# Patient Record
Sex: FEMALE | Race: OTHER | NOT HISPANIC OR LATINO | URBAN - METROPOLITAN AREA
[De-identification: names, ages, dates, MRNs, and addresses within clinical notes are randomized per-mention and may not be internally consistent; named-entity substitution may affect disease eponyms.]

---

## 2017-02-01 ENCOUNTER — EMERGENCY (EMERGENCY)
Facility: HOSPITAL | Age: 32
LOS: 1 days | Discharge: HOME | End: 2017-02-01
Attending: EMERGENCY MEDICINE | Admitting: EMERGENCY MEDICINE
Payer: MEDICAID

## 2017-02-01 VITALS
DIASTOLIC BLOOD PRESSURE: 102 MMHG | SYSTOLIC BLOOD PRESSURE: 127 MMHG | HEART RATE: 101 BPM | TEMPERATURE: 98 F | OXYGEN SATURATION: 98 % | RESPIRATION RATE: 20 BRPM

## 2017-02-01 VITALS
HEART RATE: 96 BPM | OXYGEN SATURATION: 100 % | DIASTOLIC BLOOD PRESSURE: 98 MMHG | SYSTOLIC BLOOD PRESSURE: 120 MMHG | RESPIRATION RATE: 20 BRPM

## 2017-02-01 DIAGNOSIS — R06.02 SHORTNESS OF BREATH: ICD-10-CM

## 2017-02-01 PROCEDURE — 99284 EMERGENCY DEPT VISIT MOD MDM: CPT | Mod: 25

## 2017-02-01 PROCEDURE — 99284 EMERGENCY DEPT VISIT MOD MDM: CPT

## 2017-02-01 RX ORDER — SODIUM CHLORIDE 9 MG/ML
3 INJECTION INTRAMUSCULAR; INTRAVENOUS; SUBCUTANEOUS ONCE
Qty: 0 | Refills: 0 | Status: DISCONTINUED | OUTPATIENT
Start: 2017-02-01 | End: 2017-02-05

## 2017-02-01 NOTE — ED PROVIDER NOTE - ATTENDING CONTRIBUTION TO CARE
Patient refused ED attending exam and history of chest pain, shortness of breath, and muscle pains in legs. patient offered labs, cta chest to r/o pe, ultrasound of extremity and heart to r/o effusion and life threatening issues.  patient declines labs and ultrasound.  Patient with capacity and insight into situation, treatment, risks, benefits, alternative therapies, and understands they can ask any questions if needed.  see progress note above.

## 2017-02-01 NOTE — ED PROVIDER NOTE - MEDICAL DECISION MAKING DETAILS
WIll get labs, CXR, Echo for effusion, Trop to check for right heart strain and Bnp for right heart strain. WIll get CTA chest for PE.

## 2017-02-01 NOTE — ED PROVIDER NOTE - PROGRESS NOTE DETAILS
The patient is leaving against medical advice. The patient has the capacity to refuse further medical evaluation and care. I had a lengthy discussion with the patient in which appropriate further evaluation and treatment was offered to the patient, and they declined. The patient understands that as a consequence of this decision they may be at risk for clinical deterioration including permanent disability and death and verbalized this understanding. Clear return precautions were discussed. The patient was urged to seek follow-up care, with appropriate referrals made as needed.  Improved pt of possible clot in her lungs and a possible effusion around her heart. Pt refused to have blood work or an IV drawn. ***Keith Hyatt MD*** The patient is leaving against medical advice. The patient has the capacity to refuse further medical evaluation and care. I had a lengthy discussion with the patient in which appropriate further evaluation and treatment was offered to the patient, and they declined. The patient understands that as a consequence of this decision they may be at risk for clinical deterioration including permanent disability and death and verbalized this understanding. Clear return precautions were discussed. The patient was urged to seek follow-up care, with appropriate referrals made as needed.  Improved pt of possible clot in her lungs and a possible effusion around her heart. Pt refused to have blood work or an IV drawn.

## 2017-02-01 NOTE — ED PROVIDER NOTE - REFUSAL OF SERVICE, MDM
Pt refused services refused IV, US, CTA does not want her blood drawn. 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

## 2017-02-01 NOTE — ED ADULT NURSE NOTE - OBJECTIVE STATEMENT
30 yo female brought by EMS with multiple medical complaints. Patient states that she was recent diagnosed with CREST syndrome and lupus, was started on methotraxate. Patient states that she has been experiencing shortness of breath x3 months as well as anxiety, heart racing, and bilateral leg numbness. Patient states that "I just had 17 vials of blood taken" and has several prescriptions from her MD for GI consult and ENT consult, among others. Patient states that she came to ED because "I need to know the extent of the damage on my body." Patient refusing IV and blood work at this time. Patient's mother at bedside.

## 2017-02-01 NOTE — ED PROVIDER NOTE - OBJECTIVE STATEMENT
31 YOF presents to Ed   recently started on methotrexate    hydroclorine 31 YOF presents to Ed recently started on methotrexate for lupus with CREST syndrome. Pt states she has had SOB and difficulty taking deep breathes for 3 months. Pt states she feels anxious and has been feeling her heart race. Pt states she has pain in both her legs that is also going on for 3 months. Pt denies any fever, chills, vision changes.

## 2017-02-07 PROBLEM — Z00.00 ENCOUNTER FOR PREVENTIVE HEALTH EXAMINATION: Status: ACTIVE | Noted: 2017-02-07

## 2017-02-13 ENCOUNTER — APPOINTMENT (OUTPATIENT)
Dept: GASTROENTEROLOGY | Facility: CLINIC | Age: 32
End: 2017-02-13

## 2017-02-13 VITALS — HEIGHT: 60 IN | WEIGHT: 119 LBS | BODY MASS INDEX: 23.36 KG/M2 | RESPIRATION RATE: 16 BRPM

## 2017-02-13 VITALS — DIASTOLIC BLOOD PRESSURE: 80 MMHG | TEMPERATURE: 98.3 F | SYSTOLIC BLOOD PRESSURE: 125 MMHG

## 2017-02-13 DIAGNOSIS — Z28.21 IMMUNIZATION NOT CARRIED OUT BECAUSE OF PATIENT REFUSAL: ICD-10-CM

## 2017-02-13 DIAGNOSIS — Z71.9 COUNSELING, UNSPECIFIED: ICD-10-CM

## 2017-02-13 DIAGNOSIS — Z13.9 ENCOUNTER FOR SCREENING, UNSPECIFIED: ICD-10-CM

## 2017-02-13 DIAGNOSIS — R11.2 NAUSEA WITH VOMITING, UNSPECIFIED: ICD-10-CM

## 2017-02-13 DIAGNOSIS — R68.2 DRY MOUTH, UNSPECIFIED: ICD-10-CM

## 2017-02-28 ENCOUNTER — APPOINTMENT (OUTPATIENT)
Dept: GASTROENTEROLOGY | Facility: CLINIC | Age: 32
End: 2017-02-28

## 2017-02-28 VITALS
SYSTOLIC BLOOD PRESSURE: 110 MMHG | TEMPERATURE: 98.9 F | BODY MASS INDEX: 23.36 KG/M2 | HEIGHT: 60 IN | DIASTOLIC BLOOD PRESSURE: 80 MMHG | WEIGHT: 119 LBS

## 2017-02-28 DIAGNOSIS — R68.81 EARLY SATIETY: ICD-10-CM

## 2017-02-28 DIAGNOSIS — K21.9 GASTRO-ESOPHAGEAL REFLUX DISEASE W/OUT ESOPHAGITIS: ICD-10-CM

## 2017-02-28 DIAGNOSIS — R14.0 ABDOMINAL DISTENSION (GASEOUS): ICD-10-CM

## 2017-03-07 ENCOUNTER — RESULT REVIEW (OUTPATIENT)
Age: 32
End: 2017-03-07

## 2017-03-08 ENCOUNTER — APPOINTMENT (OUTPATIENT)
Dept: GASTROENTEROLOGY | Facility: HOSPITAL | Age: 32
End: 2017-03-08

## 2017-03-08 ENCOUNTER — OUTPATIENT (OUTPATIENT)
Dept: OUTPATIENT SERVICES | Facility: HOSPITAL | Age: 32
LOS: 1 days | Discharge: HOME | End: 2017-03-08
Payer: MEDICAID

## 2017-03-08 DIAGNOSIS — K21.9 GASTRO-ESOPHAGEAL REFLUX DISEASE WITHOUT ESOPHAGITIS: ICD-10-CM

## 2017-03-08 LAB — HCG UR QL: NEGATIVE — SIGNIFICANT CHANGE UP

## 2017-03-08 PROCEDURE — 88312 SPECIAL STAINS GROUP 1: CPT | Mod: 26

## 2017-03-08 PROCEDURE — 88305 TISSUE EXAM BY PATHOLOGIST: CPT | Mod: 26

## 2017-03-13 LAB — SURGICAL PATHOLOGY STUDY: SIGNIFICANT CHANGE UP

## 2017-03-21 ENCOUNTER — APPOINTMENT (OUTPATIENT)
Dept: GASTROENTEROLOGY | Facility: CLINIC | Age: 32
End: 2017-03-21

## 2017-03-21 VITALS
SYSTOLIC BLOOD PRESSURE: 100 MMHG | TEMPERATURE: 98.1 F | HEIGHT: 60 IN | BODY MASS INDEX: 22.97 KG/M2 | WEIGHT: 117 LBS | DIASTOLIC BLOOD PRESSURE: 60 MMHG

## 2017-03-21 VITALS — TEMPERATURE: 98.4 F | HEIGHT: 60 IN

## 2017-03-21 DIAGNOSIS — K59.00 CONSTIPATION, UNSPECIFIED: ICD-10-CM

## 2017-03-21 DIAGNOSIS — M34.1 CR(E)ST SYNDROME: ICD-10-CM

## 2017-03-21 DIAGNOSIS — K29.60 OTHER GASTRITIS W/OUT BLEEDING: ICD-10-CM

## 2017-03-21 DIAGNOSIS — L70.9 ACNE, UNSPECIFIED: ICD-10-CM

## 2017-03-21 DIAGNOSIS — Z09 ENCOUNTER FOR FOLLOW-UP EXAMINATION AFTER COMPLETED TREATMENT FOR CONDITIONS OTHER THAN MALIGNANT NEOPLASM: ICD-10-CM

## 2017-03-21 DIAGNOSIS — K21.9 GASTRO-ESOPHAGEAL REFLUX DISEASE W/OUT ESOPHAGITIS: ICD-10-CM

## 2017-03-21 DIAGNOSIS — R11.0 NAUSEA: ICD-10-CM

## 2017-03-21 DIAGNOSIS — R11.10 VOMITING, UNSPECIFIED: ICD-10-CM

## 2017-03-21 DIAGNOSIS — K58.2 MIXED IRRITABLE BOWEL SYNDROME: ICD-10-CM

## 2017-03-21 DIAGNOSIS — K44.9 GASTRO-ESOPHAGEAL REFLUX DISEASE W/OUT ESOPHAGITIS: ICD-10-CM

## 2017-03-26 PROBLEM — K59.00 CONSTIPATION: Status: ACTIVE | Noted: 2017-03-21

## 2017-03-26 PROBLEM — M34.1 CREST SYNDROME: Status: ACTIVE | Noted: 2017-02-13

## 2017-03-26 PROBLEM — K58.2 IRRITABLE BOWEL SYNDROME WITH BOTH CONSTIPATION AND DIARRHEA: Status: ACTIVE | Noted: 2017-02-28

## 2017-03-26 PROBLEM — R11.0 NAUSEA: Status: ACTIVE | Noted: 2017-03-21

## 2017-03-26 PROBLEM — K29.60 EROSIVE GASTRITIS: Status: ACTIVE | Noted: 2017-03-26

## 2017-03-26 PROBLEM — R11.10 VOMITING: Status: ACTIVE | Noted: 2017-03-21

## 2017-03-26 PROBLEM — K21.9 HIATAL HERNIA WITH GERD: Status: ACTIVE | Noted: 2017-03-26

## 2017-03-26 PROBLEM — L70.9 ACNE, UNSPECIFIED ACNE TYPE: Status: ACTIVE | Noted: 2017-02-13

## 2017-03-26 RX ORDER — FAMOTIDINE 40 MG/1
40 TABLET, FILM COATED ORAL
Qty: 30 | Refills: 3 | Status: DISCONTINUED | COMMUNITY
Start: 2017-02-28 | End: 2017-03-26

## 2017-04-04 ENCOUNTER — APPOINTMENT (OUTPATIENT)
Dept: GASTROENTEROLOGY | Facility: CLINIC | Age: 32
End: 2017-04-04

## 2017-05-16 ENCOUNTER — APPOINTMENT (OUTPATIENT)
Dept: ELECTROPHYSIOLOGY | Facility: CLINIC | Age: 32
End: 2017-05-16

## 2017-05-16 ENCOUNTER — NON-APPOINTMENT (OUTPATIENT)
Age: 32
End: 2017-05-16

## 2017-05-16 VITALS — HEIGHT: 60 IN | WEIGHT: 114 LBS | OXYGEN SATURATION: 100 % | BODY MASS INDEX: 22.38 KG/M2 | HEART RATE: 90 BPM

## 2017-05-16 VITALS — DIASTOLIC BLOOD PRESSURE: 65 MMHG | SYSTOLIC BLOOD PRESSURE: 105 MMHG

## 2017-05-16 RX ORDER — DICYCLOMINE HYDROCHLORIDE 10 MG/1
10 CAPSULE ORAL 3 TIMES DAILY
Qty: 90 | Refills: 1 | Status: DISCONTINUED | COMMUNITY
Start: 2017-02-28 | End: 2017-05-16

## 2017-05-16 RX ORDER — PANTOPRAZOLE 40 MG/1
40 TABLET, DELAYED RELEASE ORAL DAILY
Qty: 30 | Refills: 3 | Status: DISCONTINUED | COMMUNITY
Start: 2017-03-21 | End: 2017-05-16

## 2017-05-16 RX ORDER — HYDROXYCHLOROQUINE SULFATE 200 MG/1
200 TABLET, FILM COATED ORAL TWICE DAILY
Qty: 60 | Refills: 3 | Status: DISCONTINUED | COMMUNITY
Start: 2017-02-13 | End: 2017-05-16

## 2017-05-16 RX ORDER — LACTOBACILLUS RHAMNOSUS GG 10B CELL
CAPSULE ORAL
Qty: 30 | Refills: 0 | Status: DISCONTINUED | OUTPATIENT
Start: 2017-02-28 | End: 2017-05-16

## 2017-05-16 RX ORDER — METOCLOPRAMIDE HYDROCHLORIDE 5 MG/5ML
5 SOLUTION ORAL
Qty: 200 | Refills: 0 | Status: DISCONTINUED | COMMUNITY
Start: 2017-04-25

## 2017-05-16 RX ORDER — PRENATAL VIT NO.130/IRON/FOLIC 27MG-0.8MG
27-0.8 TABLET ORAL
Qty: 30 | Refills: 0 | Status: DISCONTINUED | COMMUNITY
Start: 2017-03-16 | End: 2017-05-16

## 2017-05-16 RX ORDER — SACCHAROMYCES BOULARDII 50 MG
250 CAPSULE ORAL
Qty: 30 | Refills: 2 | Status: DISCONTINUED | COMMUNITY
Start: 2017-03-21 | End: 2017-05-16

## 2017-05-16 RX ORDER — ALUMINUM HYDROXIDE AND MAGNESIUM TRISILICATE 80; 14.2 MG/1; MG/1
80-14.2 TABLET, CHEWABLE ORAL
Qty: 30 | Refills: 2 | Status: DISCONTINUED | COMMUNITY
Start: 2017-03-21 | End: 2017-05-16

## 2017-05-16 RX ORDER — VITAMIN B COMPLEX
CAPSULE ORAL
Qty: 60 | Refills: 0 | Status: DISCONTINUED | COMMUNITY
Start: 2017-04-25

## 2017-05-16 RX ORDER — METHOTREXATE 2.5 MG/1
2.5 TABLET ORAL
Refills: 0 | Status: DISCONTINUED | COMMUNITY
End: 2017-05-16

## 2017-05-16 RX ORDER — BUPRENORPHINE HYDROCHLORIDE AND NALOXONE HYDROCHLORIDE 1.4; .36 MG/1; MG/1
1.4-0.36 TABLET, ORALLY DISINTEGRATING SUBLINGUAL
Refills: 0 | Status: DISCONTINUED | COMMUNITY
End: 2017-05-16

## 2017-05-16 RX ORDER — METHOTREXATE SODIUM 1 G/1
1 INJECTION, POWDER, LYOPHILIZED, FOR SOLUTION INTRA-ARTERIAL; INTRAMUSCULAR; INTRATHECAL; INTRAVENOUS
Qty: 10 | Refills: 0 | Status: DISCONTINUED | COMMUNITY
Start: 2017-05-16 | End: 2017-05-16

## 2017-05-16 RX ORDER — SYRINGE AND NEEDLE,INSULIN,1ML 31 GX5/16"
29G X 1/2" SYRINGE, EMPTY DISPOSABLE MISCELLANEOUS
Qty: 10 | Refills: 0 | Status: DISCONTINUED | COMMUNITY
Start: 2017-04-25

## 2017-05-16 RX ORDER — ESOMEPRAZOLE MAGNESIUM 20 MG/1
20 TABLET ORAL
Qty: 30 | Refills: 0 | Status: DISCONTINUED | COMMUNITY
Start: 2017-05-16 | End: 2017-05-16

## 2017-05-16 RX ORDER — FOLIC ACID 1 MG/1
1 TABLET ORAL
Qty: 30 | Refills: 0 | Status: DISCONTINUED | COMMUNITY
Start: 2017-02-21

## 2017-05-16 RX ORDER — ERGOCALCIFEROL 1.25 MG/1
1.25 MG CAPSULE, LIQUID FILLED ORAL
Qty: 4 | Refills: 0 | Status: DISCONTINUED | COMMUNITY
Start: 2017-02-21 | End: 2017-05-16

## 2017-05-16 RX ORDER — FLUTICASONE PROPIONATE 50 UG/1
50 SPRAY, METERED NASAL
Qty: 15 | Refills: 0 | Status: DISCONTINUED | COMMUNITY
Start: 2017-05-09

## 2017-05-16 RX ORDER — ALPRAZOLAM 0.5 MG/1
0.5 TABLET ORAL
Qty: 15 | Refills: 0 | Status: DISCONTINUED | COMMUNITY
Start: 2017-04-25

## 2017-05-16 RX ORDER — SUCRALFATE 1 G/1
1 TABLET ORAL 3 TIMES DAILY
Qty: 90 | Refills: 3 | Status: DISCONTINUED | COMMUNITY
Start: 2017-03-21 | End: 2017-05-16

## 2017-05-16 RX ORDER — MONTELUKAST 10 MG/1
10 TABLET, FILM COATED ORAL
Qty: 30 | Refills: 0 | Status: DISCONTINUED | COMMUNITY
Start: 2017-03-28

## 2017-07-05 ENCOUNTER — TRANSCRIPTION ENCOUNTER (OUTPATIENT)
Age: 32
End: 2017-07-05

## 2017-07-05 ENCOUNTER — OUTPATIENT (OUTPATIENT)
Dept: INPATIENT UNIT | Facility: HOSPITAL | Age: 32
LOS: 1 days | Discharge: HOME | End: 2017-07-05
Payer: MEDICAID

## 2017-07-05 VITALS
HEIGHT: 60 IN | DIASTOLIC BLOOD PRESSURE: 85 MMHG | TEMPERATURE: 98 F | OXYGEN SATURATION: 98 % | WEIGHT: 111.99 LBS | SYSTOLIC BLOOD PRESSURE: 131 MMHG | RESPIRATION RATE: 20 BRPM | HEART RATE: 81 BPM

## 2017-07-05 DIAGNOSIS — I47.1 SUPRAVENTRICULAR TACHYCARDIA: ICD-10-CM

## 2017-07-05 LAB
ALBUMIN SERPL ELPH-MCNC: 4.3 G/DL — SIGNIFICANT CHANGE UP (ref 3.3–5)
ALP SERPL-CCNC: 69 U/L — SIGNIFICANT CHANGE UP (ref 40–120)
ALT FLD-CCNC: 10 U/L RC — SIGNIFICANT CHANGE UP (ref 10–45)
ANION GAP SERPL CALC-SCNC: 13 MMOL/L — SIGNIFICANT CHANGE UP (ref 5–17)
AST SERPL-CCNC: 18 U/L — SIGNIFICANT CHANGE UP (ref 10–40)
BILIRUB SERPL-MCNC: 0.2 MG/DL — SIGNIFICANT CHANGE UP (ref 0.2–1.2)
BUN SERPL-MCNC: 15 MG/DL — SIGNIFICANT CHANGE UP (ref 7–23)
CALCIUM SERPL-MCNC: 9.2 MG/DL — SIGNIFICANT CHANGE UP (ref 8.4–10.5)
CHLORIDE SERPL-SCNC: 105 MMOL/L — SIGNIFICANT CHANGE UP (ref 96–108)
CO2 SERPL-SCNC: 21 MMOL/L — LOW (ref 22–31)
CREAT SERPL-MCNC: 0.75 MG/DL — SIGNIFICANT CHANGE UP (ref 0.5–1.3)
GLUCOSE SERPL-MCNC: 97 MG/DL — SIGNIFICANT CHANGE UP (ref 70–99)
HCG SERPL QL: NEGATIVE — SIGNIFICANT CHANGE UP
HCT VFR BLD CALC: 40.1 % — SIGNIFICANT CHANGE UP (ref 34.5–45)
HGB BLD-MCNC: 14 G/DL — SIGNIFICANT CHANGE UP (ref 11.5–15.5)
MCHC RBC-ENTMCNC: 33.4 PG — SIGNIFICANT CHANGE UP (ref 27–34)
MCHC RBC-ENTMCNC: 34.9 GM/DL — SIGNIFICANT CHANGE UP (ref 32–36)
MCV RBC AUTO: 95.9 FL — SIGNIFICANT CHANGE UP (ref 80–100)
PLATELET # BLD AUTO: 119 K/UL — LOW (ref 150–400)
POTASSIUM SERPL-MCNC: 4.1 MMOL/L — SIGNIFICANT CHANGE UP (ref 3.5–5.3)
POTASSIUM SERPL-SCNC: 4.1 MMOL/L — SIGNIFICANT CHANGE UP (ref 3.5–5.3)
PROT SERPL-MCNC: 7.2 G/DL — SIGNIFICANT CHANGE UP (ref 6–8.3)
RBC # BLD: 4.18 M/UL — SIGNIFICANT CHANGE UP (ref 3.8–5.2)
RBC # FLD: 12 % — SIGNIFICANT CHANGE UP (ref 10.3–14.5)
SODIUM SERPL-SCNC: 139 MMOL/L — SIGNIFICANT CHANGE UP (ref 135–145)
WBC # BLD: 9.2 K/UL — SIGNIFICANT CHANGE UP (ref 3.8–10.5)
WBC # FLD AUTO: 9.2 K/UL — SIGNIFICANT CHANGE UP (ref 3.8–10.5)

## 2017-07-05 PROCEDURE — 93621 COMP EP EVL L PAC&REC C SINS: CPT

## 2017-07-05 PROCEDURE — C1759: CPT

## 2017-07-05 PROCEDURE — C1730: CPT

## 2017-07-05 PROCEDURE — C1766: CPT

## 2017-07-05 PROCEDURE — 93613 INTRACARDIAC EPHYS 3D MAPG: CPT | Mod: 59

## 2017-07-05 PROCEDURE — 93462 L HRT CATH TRNSPTL PUNCTURE: CPT | Mod: 59

## 2017-07-05 PROCEDURE — 93662 INTRACARDIAC ECG (ICE): CPT

## 2017-07-05 PROCEDURE — C1769: CPT

## 2017-07-05 PROCEDURE — C1894: CPT

## 2017-07-05 PROCEDURE — 93653 COMPRE EP EVAL TX SVT: CPT

## 2017-07-05 PROCEDURE — C1732: CPT

## 2017-07-05 RX ORDER — BUPRENORPHINE AND NALOXONE 2; .5 MG/1; MG/1
2 TABLET SUBLINGUAL DAILY
Qty: 0 | Refills: 0 | Status: DISCONTINUED | OUTPATIENT
Start: 2017-07-05 | End: 2017-07-06

## 2017-07-05 RX ORDER — SODIUM CHLORIDE 9 MG/ML
3 INJECTION INTRAMUSCULAR; INTRAVENOUS; SUBCUTANEOUS EVERY 8 HOURS
Qty: 0 | Refills: 0 | Status: DISCONTINUED | OUTPATIENT
Start: 2017-07-05 | End: 2017-07-06

## 2017-07-05 RX ORDER — ACETAMINOPHEN 500 MG
650 TABLET ORAL ONCE
Qty: 0 | Refills: 0 | Status: DISCONTINUED | OUTPATIENT
Start: 2017-07-05 | End: 2017-07-06

## 2017-07-05 RX ORDER — ACETAMINOPHEN 500 MG
650 TABLET ORAL EVERY 6 HOURS
Qty: 0 | Refills: 0 | Status: DISCONTINUED | OUTPATIENT
Start: 2017-07-05 | End: 2017-07-06

## 2017-07-05 RX ADMIN — SODIUM CHLORIDE 3 MILLILITER(S): 9 INJECTION INTRAMUSCULAR; INTRAVENOUS; SUBCUTANEOUS at 21:21

## 2017-07-05 RX ADMIN — Medication 650 MILLIGRAM(S): at 15:38

## 2017-07-05 RX ADMIN — Medication 650 MILLIGRAM(S): at 14:28

## 2017-07-05 RX ADMIN — SODIUM CHLORIDE 3 MILLILITER(S): 9 INJECTION INTRAMUSCULAR; INTRAVENOUS; SUBCUTANEOUS at 14:30

## 2017-07-05 NOTE — DISCHARGE NOTE ADULT - PATIENT PORTAL LINK FT
“You can access the Follow[facility] Patient Portal, offered by [facility], by registering with the following website: [email or web address]”

## 2017-07-05 NOTE — H&P CARDIOLOGY - PMH
CREST (calcinosis, Raynaud's phenomenon, esophageal dysfunction, sclerodactyly, telangiectasia)    Lupus    Premature ovarian failure    Raynaud's disease

## 2017-07-05 NOTE — PROGRESS NOTE ADULT - SUBJECTIVE AND OBJECTIVE BOX
INTERVAL HPI/OVERNIGHT EVENTS:    MEDICATIONS  (STANDING):  sodium chloride 0.9% lock flush 3 milliLiter(s) IV Push every 8 hours  estrogens conjugated 0.625 mG/medroxyPROGESTERone  2.5 mG 1 Tablet(s) Oral daily  buprenorphine 8 mG/naloxone 2 mG SL  Tablet 2 Tablet(s) SubLingual daily    MEDICATIONS  (PRN):  acetaminophen   Tablet. 650 milliGRAM(s) Oral every 6 hours PRN Mild Pain (1 - 3)  acetaminophen   Tablet. 650 milliGRAM(s) Oral once PRN Mild Pain (1 - 3)      Allergies    No Known Allergies    Intolerances      ROS:  General: Pt denies fever/chills    Cardiovascular: denies chest pain/palpitations/leg edema    Respiratory and Thorax: denies SOB/cough/wheezing    Gastrointestinal: denies abdominal pain/diarrhea/constipation/bloody stool    Musculoskeletal: denies joint pain or swelling, denies restricted motion    Skin: denies rashes/sores    Hematologic: denies abnormal bleeding    Vital Signs Last 24 Hrs  T(C): 36.8 (05 Jul 2017 13:50), Max: 36.8 (05 Jul 2017 13:50)  T(F): 98.3 (05 Jul 2017 13:50), Max: 98.3 (05 Jul 2017 13:50)  HR: 78 (05 Jul 2017 16:35) (78 - 89)  BP: 110/83 (05 Jul 2017 16:35) (92/54 - 131/85)  BP(mean): 100 (05 Jul 2017 07:20) (100 - 100)  RR: 18 (05 Jul 2017 16:35) (18 - 20)  SpO2: 99% (05 Jul 2017 16:35) (98% - 100%)          TELE:    EKG: INTERVAL HPI/OVERNIGHT EVENTS: S/P AVRT/ AVNRT ablation    MEDICATIONS  (STANDING):  sodium chloride 0.9% lock flush 3 milliLiter(s) IV Push every 8 hours  estrogens conjugated 0.625 mG/medroxyPROGESTERone  2.5 mG 1 Tablet(s) Oral daily  buprenorphine 8 mG/naloxone 2 mG SL  Tablet 2 Tablet(s) SubLingual daily    MEDICATIONS  (PRN):  acetaminophen   Tablet. 650 milliGRAM(s) Oral every 6 hours PRN Mild Pain (1 - 3)  acetaminophen   Tablet. 650 milliGRAM(s) Oral once PRN Mild Pain (1 - 3)      Allergies    No Known Allergies    Intolerances        Vital Signs Last 24 Hrs  T(C): 36.8 (05 Jul 2017 13:50), Max: 36.8 (05 Jul 2017 13:50)  T(F): 98.3 (05 Jul 2017 13:50), Max: 98.3 (05 Jul 2017 13:50)  HR: 78 (05 Jul 2017 16:35) (78 - 89)  BP: 110/83 (05 Jul 2017 16:35) (92/54 - 131/85)  BP(mean): 100 (05 Jul 2017 07:20) (100 - 100)  RR: 18 (05 Jul 2017 16:35) (18 - 20)  SpO2: 99% (05 Jul 2017 16:35) (98% - 100%)    Tele: SR/ST with 80- 110's  Neurologic: Non-focal, A&Ox3.  No neuro deficits  Procedure Site: S/P Ablation Right groin . No erythema or hematoma.   -VS, labs, diet, activity bed rest 4 hours   -Continue current medications  -Post s/p  ablation  -Pt. to be discharged home 7/5 if stable overnight follow with EP clinic ( Dr. Perry ) on 8/3 @ 8:45 am INTERVAL HPI/OVERNIGHT EVENTS: S/P AVRT/ AVNRT ablation    MEDICATIONS  (STANDING):  sodium chloride 0.9% lock flush 3 milliLiter(s) IV Push every 8 hours  estrogens conjugated 0.625 mG/medroxyPROGESTERone  2.5 mG 1 Tablet(s) Oral daily  buprenorphine 8 mG/naloxone 2 mG SL  Tablet 2 Tablet(s) SubLingual daily    MEDICATIONS  (PRN):  acetaminophen   Tablet. 650 milliGRAM(s) Oral every 6 hours PRN Mild Pain (1 - 3)  acetaminophen   Tablet. 650 milliGRAM(s) Oral once PRN Mild Pain (1 - 3)      Allergies    No Known Allergies    Intolerances        Vital Signs Last 24 Hrs  T(C): 36.8 (05 Jul 2017 13:50), Max: 36.8 (05 Jul 2017 13:50)  T(F): 98.3 (05 Jul 2017 13:50), Max: 98.3 (05 Jul 2017 13:50)  HR: 78 (05 Jul 2017 16:35) (78 - 89)  BP: 110/83 (05 Jul 2017 16:35) (92/54 - 131/85)  BP(mean): 100 (05 Jul 2017 07:20) (100 - 100)  RR: 18 (05 Jul 2017 16:35) (18 - 20)  SpO2: 99% (05 Jul 2017 16:35) (98% - 100%)    Tele: SR/ST with 80- 110's  Neurologic: Non-focal, A&Ox3.  No neuro deficits  Procedure Site: S/P Ablation Right groin . No erythema or hematoma.   -VS, labs, diet, activity bed rest 4 hours   -Continue current medications  -Post s/p  ablation  -Pt. to be discharged home 7/5 if stable overnight follow with EP clinic ( Dr. Perry ) INTERVAL HPI/OVERNIGHT EVENTS: S/P AVRT/ AVNRT ablation    MEDICATIONS  (STANDING):  sodium chloride 0.9% lock flush 3 milliLiter(s) IV Push every 8 hours  estrogens conjugated 0.625 mG/medroxyPROGESTERone  2.5 mG 1 Tablet(s) Oral daily  buprenorphine 8 mG/naloxone 2 mG SL  Tablet 2 Tablet(s) SubLingual daily    MEDICATIONS  (PRN):  acetaminophen   Tablet. 650 milliGRAM(s) Oral every 6 hours PRN Mild Pain (1 - 3)  acetaminophen   Tablet. 650 milliGRAM(s) Oral once PRN Mild Pain (1 - 3)      Allergies    No Known Allergies    Intolerances        Vital Signs Last 24 Hrs  T(C): 36.8 (05 Jul 2017 13:50), Max: 36.8 (05 Jul 2017 13:50)  T(F): 98.3 (05 Jul 2017 13:50), Max: 98.3 (05 Jul 2017 13:50)  HR: 78 (05 Jul 2017 16:35) (78 - 89)  BP: 110/83 (05 Jul 2017 16:35) (92/54 - 131/85)  BP(mean): 100 (05 Jul 2017 07:20) (100 - 100)  RR: 18 (05 Jul 2017 16:35) (18 - 20)  SpO2: 99% (05 Jul 2017 16:35) (98% - 100%)    Tele: SR/ST with 80- 110's  Neurologic: Non-focal, A&Ox3.  No neuro deficits  Procedure Site: S/P Ablation Right groin . No erythema or hematoma.   -VS, labs, diet, activity bed rest 4 hours   -Continue current medications  -Pt. to be discharged home 7/6 if stable overnight follow as outpatient with Dr. Carranza

## 2017-07-05 NOTE — DISCHARGE NOTE ADULT - MEDICATION SUMMARY - MEDICATIONS TO TAKE
I will START or STAY ON the medications listed below when I get home from the hospital:    Suboxone 8 mg-2 mg sublingual film  -- 2 each under tongue once a day  -- Indication: For home med    Excedrin oral tablet  -- 2 tab(s) by mouth every 6 hours  -- Indication: For home med    Prempro 0.625 mg-2.5 mg oral tablet  -- 1 tab(s) by mouth once a day  -- Indication: For home med

## 2017-07-05 NOTE — DISCHARGE NOTE ADULT - PLAN OF CARE
Your heartbeat will be controlled. Your catheter/groin site will be free of infection and severe bleeding. Appointments: please call [phone] for follow-up appointment with your electrophysiology (EP) cardiologist within 2-3 weeks after discharge.   Groin Site Care: You can take shower in 24 hours. Keep the area clean and dry. No submersion of your catheter/groin site in bath tubs, pools, or any water for 1 week.   Activity: No driving for 24 hours. No strenuous activity or heavy lifting more than 10 pounds for 1 week.  You may need to: Quit smoking. Limit your alcohol intake.   Call Your Doctor immediately if you have: A fast or irregular heart beat; lightheadedness; severe chest pain or shortness of breath; fever or chills; bleeding, pain, redness, swelling, warmth or drainage at or near the catheter/groin site at [phone]   No heavy lifting, strenuous activity, bending, straining, or unnecessary stair climbing for 2 weeks. No driving for 2 days. You may shower 24 hours following the procedure but avoid baths/swimming for 1 week. Check your groin site for bleeding and/or swelling daily following procedure and call your doctor immediately if it occurs or if you experience increased pain at the site. Appointment: Tuesday, July 25th at 2:30PM.  Groin Site Care: You can take shower in 24 hours. Keep the area clean and dry. No submersion of your catheter/groin site in bath tubs, pools, or any water for 1 week.   Activity: No driving for 24 hours. No strenuous activity or heavy lifting more than 10 pounds for 1 week.  You may need to: Quit smoking. Limit your alcohol intake.   Call Your Doctor immediately if you have: A fast or irregular heart beat; lightheadedness; severe chest pain or shortness of breath; fever or chills; bleeding, pain, redness, swelling, warmth or drainage at or near the catheter/groin site at [phone]   No heavy lifting, strenuous activity, bending, straining, or unnecessary stair climbing for 2 weeks. No driving for 2 days. You may shower 24 hours following the procedure but avoid baths/swimming for 1 week. Check your groin site for bleeding and/or swelling daily following procedure and call your doctor immediately if it occurs or if you experience increased pain at the site.

## 2017-07-05 NOTE — DISCHARGE NOTE ADULT - CARE PROVIDER_API CALL
Louie Carranza (MD), Cardiac Electrophysiology; Cardiovascular Disease; Internal Medicine  [id number] [address] [place] [id number]  Phone: [phone]  Fax: [phone]

## 2017-07-05 NOTE — DISCHARGE NOTE ADULT - CARE PLAN
Principal Discharge DX:	SVT (supraventricular tachycardia)  Goal:	Your heartbeat will be controlled. Your catheter/groin site will be free of infection and severe bleeding.  Instructions for follow-up, activity and diet:	Appointments: please call [phone] for follow-up appointment with your electrophysiology (EP) cardiologist within 2-3 weeks after discharge.   Groin Site Care: You can take shower in 24 hours. Keep the area clean and dry. No submersion of your catheter/groin site in bath tubs, pools, or any water for 1 week.   Activity: No driving for 24 hours. No strenuous activity or heavy lifting more than 10 pounds for 1 week.  You may need to: Quit smoking. Limit your alcohol intake.   Call Your Doctor immediately if you have: A fast or irregular heart beat; lightheadedness; severe chest pain or shortness of breath; fever or chills; bleeding, pain, redness, swelling, warmth or drainage at or near the catheter/groin site at [phone]   No heavy lifting, strenuous activity, bending, straining, or unnecessary stair climbing for 2 weeks. No driving for 2 days. You may shower 24 hours following the procedure but avoid baths/swimming for 1 week. Check your groin site for bleeding and/or swelling daily following procedure and call your doctor immediately if it occurs or if you experience increased pain at the site. Principal Discharge DX:	SVT (supraventricular tachycardia)  Goal:	Your heartbeat will be controlled. Your catheter/groin site will be free of infection and severe bleeding.  Instructions for follow-up, activity and diet:	Appointment: Tuesday, July 25th at 2:30PM.  Groin Site Care: You can take shower in 24 hours. Keep the area clean and dry. No submersion of your catheter/groin site in bath tubs, pools, or any water for 1 week.   Activity: No driving for 24 hours. No strenuous activity or heavy lifting more than 10 pounds for 1 week.  You may need to: Quit smoking. Limit your alcohol intake.   Call Your Doctor immediately if you have: A fast or irregular heart beat; lightheadedness; severe chest pain or shortness of breath; fever or chills; bleeding, pain, redness, swelling, warmth or drainage at or near the catheter/groin site at [phone]   No heavy lifting, strenuous activity, bending, straining, or unnecessary stair climbing for 2 weeks. No driving for 2 days. You may shower 24 hours following the procedure but avoid baths/swimming for 1 week. Check your groin site for bleeding and/or swelling daily following procedure and call your doctor immediately if it occurs or if you experience increased pain at the site. Principal Discharge DX:	SVT (supraventricular tachycardia)  Goal:	Your heartbeat will be controlled. Your catheter/groin site will be free of infection and severe bleeding.  Instructions for follow-up, activity and diet:	Appointment: Tuesday, July 25th at 2:30PM.  Groin Site Care: You can take shower in 24 hours. Keep the area clean and dry. No submersion of your catheter/groin site in bath tubs, pools, or any water for 1 week.   Activity: No driving for 24 hours. No strenuous activity or heavy lifting more than 10 pounds for 1 week.  You may need to: Quit smoking. Limit your alcohol intake.   Call Your Doctor immediately if you have: A fast or irregular heart beat; lightheadedness; severe chest pain or shortness of breath; fever or chills; bleeding, pain, redness, swelling, warmth or drainage at or near the catheter/groin site at [phone]   No heavy lifting, strenuous activity, bending, straining, or unnecessary stair climbing for 2 weeks. No driving for 2 days. You may shower 24 hours following the procedure but avoid baths/swimming for 1 week. Check your groin site for bleeding and/or swelling daily following procedure and call your doctor immediately if it occurs or if you experience increased pain at the site.

## 2017-07-05 NOTE — H&P CARDIOLOGY - HISTORY OF PRESENT ILLNESS
31 y/o female with PMHx of SLE, POF, CREST syndrome, GERD, current smoker, p/w c/o palpitations, skipped beats, dyspnea, slight chest discomfort with hot flashes, syncopal episode in the distant past with LOC, event recorder reveals episodes of Sinus tachycardia & runs of PSVT could be AVNRT has normal LV systolic function with Ef >55% seen & evaluated By Dr fuentes & now for SVt ablation.

## 2017-07-05 NOTE — DISCHARGE NOTE ADULT - HOSPITAL COURSE
31 y/o female with PMHx of SLE, POF, CREST syndrome, GERD, current smoker, p/w c/o palpitations, skipped beats, dyspnea, slight chest discomfort with hot flashes, syncopal episode in the distant past with LOC, event recorder reveals episodes of Sinus tachycardia & runs of PSVT could be AVNRT has normal LV systolic function with Ef >55% seen & evaluated By Dr fuentes & now for SVT ablation.  Pt s/p SVT ablation via R groin. Pt tolerated procedure well and overnight remained uneventful. No c/o chest pain or SOB. Pt is hemodynamically stable, EKG and all lab results reviewed. Insertion/incision site benign, no bleeding or hematoma, and cath site dressing changed. Discharge teaching provided to Pt/caretaker and verbalized understanding the instruction. Pt is stable for discharge home as per attending. 33 y/o female with PMHx of SLE, POF, CREST syndrome, GERD, current smoker, p/w c/o palpitations, skipped beats, dyspnea, slight chest discomfort with hot flashes, syncopal episode in the distant past with LOC, event recorder reveals episodes of Sinus tachycardia & runs of PSVT could be AVNRT has normal LV systolic function with Ef >55% seen & evaluated By Dr fuentes & now for SVT ablation.  Pt s/p SVT ablation via R groin. Pt tolerated procedure well and overnight remained uneventful. No c/o chest pain or SOB. Pt is hemodynamically stable, EKG and all lab results reviewed. Insertion/incision site benign, no bleeding or hematoma, and cath site dressing changed. Discharge teaching provided to Pt/caretaker and verbalized understanding the instruction. Pt is stable for discharge home as per attending and will follow up at the [facility] cardiology clinic on July 25th at 2:30PM.  Pt awaiting TTE if negative for pericardial effusion with be d/c home.  Pt denies cp, sob, palpitations or site care.

## 2017-07-06 VITALS
RESPIRATION RATE: 18 BRPM | DIASTOLIC BLOOD PRESSURE: 74 MMHG | HEART RATE: 66 BPM | SYSTOLIC BLOOD PRESSURE: 101 MMHG | OXYGEN SATURATION: 99 % | TEMPERATURE: 98 F

## 2017-07-06 DIAGNOSIS — I47.1 SUPRAVENTRICULAR TACHYCARDIA: ICD-10-CM

## 2017-07-06 LAB
ANION GAP SERPL CALC-SCNC: 12 MMOL/L — SIGNIFICANT CHANGE UP (ref 5–17)
BUN SERPL-MCNC: 13 MG/DL — SIGNIFICANT CHANGE UP (ref 7–23)
CALCIUM SERPL-MCNC: 9.8 MG/DL — SIGNIFICANT CHANGE UP (ref 8.4–10.5)
CHLORIDE SERPL-SCNC: 108 MMOL/L — SIGNIFICANT CHANGE UP (ref 96–108)
CO2 SERPL-SCNC: 24 MMOL/L — SIGNIFICANT CHANGE UP (ref 22–31)
CREAT SERPL-MCNC: 0.73 MG/DL — SIGNIFICANT CHANGE UP (ref 0.5–1.3)
GLUCOSE SERPL-MCNC: 94 MG/DL — SIGNIFICANT CHANGE UP (ref 70–99)
HCT VFR BLD CALC: 41.5 % — SIGNIFICANT CHANGE UP (ref 34.5–45)
HGB BLD-MCNC: 14 G/DL — SIGNIFICANT CHANGE UP (ref 11.5–15.5)
MCHC RBC-ENTMCNC: 32.9 PG — SIGNIFICANT CHANGE UP (ref 27–34)
MCHC RBC-ENTMCNC: 33.7 GM/DL — SIGNIFICANT CHANGE UP (ref 32–36)
MCV RBC AUTO: 97.7 FL — SIGNIFICANT CHANGE UP (ref 80–100)
PLATELET # BLD AUTO: 124 K/UL — LOW (ref 150–400)
POTASSIUM SERPL-MCNC: 4.8 MMOL/L — SIGNIFICANT CHANGE UP (ref 3.5–5.3)
POTASSIUM SERPL-SCNC: 4.8 MMOL/L — SIGNIFICANT CHANGE UP (ref 3.5–5.3)
RBC # BLD: 4.25 M/UL — SIGNIFICANT CHANGE UP (ref 3.8–5.2)
RBC # FLD: 12.1 % — SIGNIFICANT CHANGE UP (ref 10.3–14.5)
SODIUM SERPL-SCNC: 144 MMOL/L — SIGNIFICANT CHANGE UP (ref 135–145)
WBC # BLD: 8.7 K/UL — SIGNIFICANT CHANGE UP (ref 3.8–10.5)
WBC # FLD AUTO: 8.7 K/UL — SIGNIFICANT CHANGE UP (ref 3.8–10.5)

## 2017-07-06 RX ORDER — ACETAMINOPHEN 500 MG
325 TABLET ORAL ONCE
Qty: 0 | Refills: 0 | Status: COMPLETED | OUTPATIENT
Start: 2017-07-06 | End: 2017-07-06

## 2017-07-06 RX ADMIN — Medication 325 MILLIGRAM(S): at 09:00

## 2017-07-06 RX ADMIN — Medication 325 MILLIGRAM(S): at 09:30

## 2017-07-06 NOTE — PROGRESS NOTE ADULT - SUBJECTIVE AND OBJECTIVE BOX
INTERVAL HPI/OVERNIGHT EVENTS: no events overnight    MEDICATIONS  (STANDING):  sodium chloride 0.9% lock flush 3 milliLiter(s) IV Push every 8 hours  estrogens conjugated 0.625 mG/medroxyPROGESTERone  2.5 mG 1 Tablet(s) Oral daily  buprenorphine 8 mG/naloxone 2 mG SL  Tablet 2 Tablet(s) SubLingual daily  acetaminophen 250 mG/aspirin 250 mG/caffeine 65 mG 1 Tablet(s) Oral once    MEDICATIONS  (PRN):  acetaminophen   Tablet. 650 milliGRAM(s) Oral every 6 hours PRN Mild Pain (1 - 3)  acetaminophen   Tablet. 650 milliGRAM(s) Oral once PRN Mild Pain (1 - 3)      Allergies    No Known Allergies      ROS:  General: Pt denies recent weight loss/fever/chills    Neurological: denies numbness or  sensation loss    HEENT: denies visual changes, no hearing loss, denies sore throat    Cardiovascular: denies chest pain/palpitations/leg edema    Respiratory and Thorax: denies SOB/cough/wheezing    Gastrointestinal: denies abdominal pain/diarrhea/constipation/bloody stool    Genitourinary: denies urinary frequency/urgency/ dysuria    Musculoskeletal: denies joint pain or swelling, denies restricted motion    Skin: denies rashes/sores      Vital Signs Last 24 Hrs  T(C): 36.7 (06 Jul 2017 12:41), Max: 36.9 (05 Jul 2017 21:46)  T(F): 98 (06 Jul 2017 12:41), Max: 98.5 (05 Jul 2017 21:46)  HR: 66 (06 Jul 2017 12:41) (61 - 93)  BP: 101/74 (06 Jul 2017 12:41) (92/54 - 123/71)  RR: 18 (06 Jul 2017 12:41) (17 - 18)  SpO2: 99% (06 Jul 2017 12:41) (98% - 100%)    Physical Exam:    Constitutional: well developed, well nourished, no deformities and no acute distress    Neurological: Alert & Oriented x 3, MALONE, no focal deficits    HEENT: NC/AT, PERRLA, EOMI,  Neck supple.    Respiratory: CTA B/L, No wheezing/crackles/rhonchi    Cardiovascular: (+) S1 & S2, RRR, No m/r/g    Gastrointestinal: soft, NT, nondistended, (+) BS    Genitourinary: non distended bladder, voiding freely    Extremities: No pedal edema, No clubbing, No cyanosis, Right groin surgical incision site clean, dry, and gauze dressing intact, no hematoma noted.    Skin:  normal skin color and pigmentation, no skin lesions      LABS:                        14.0   8.7   )-----------( 124      ( 06 Jul 2017 07:43 )             41.5     07-06    144  |  108  |  13  ----------------------------<  94  4.8   |  24  |  0.73    Ca    9.8      06 Jul 2017 07:43    TPro  7.2  /  Alb  4.3  /  TBili  0.2  /  DBili  x   /  AST  18  /  ALT  10  /  AlkPhos  69  07-05      TELE: NSR at 60-70 bpm    EKG: NSR, no acute changes

## 2017-07-06 NOTE — PROGRESS NOTE ADULT - PROBLEM SELECTOR PLAN 1
-s/p AVNRT & AVRT ablation on 7/5/17.  -f/u TTE for pericardial effusions  -f/u Dr. Carranza OP as scheduled on 7/25/17 at 2:30pm.  -Post procedure restrictions enforced with pt.  -Discharge planning today after TTE.

## 2017-07-06 NOTE — PROGRESS NOTE ADULT - ASSESSMENT
33 y/o female with PMHx of SLE, POF, CREST syndrome, GERD, current smoker, p/w c/o palpitations, skipped beats, dyspnea, slight chest discomfort with hot flashes, syncopal episode in the distant past with LOC, event recorder reveals episodes of Sinus tachycardia & runs of PSVT, normal LV systolic function with Ef >55%, presented for SVT ablation after seen & evaluated By Dr fuentes. Now s/p SVT (AVNRT & AVRT) ablation on 7/5/17.

## 2017-07-13 ENCOUNTER — APPOINTMENT (OUTPATIENT)
Dept: ELECTROPHYSIOLOGY | Facility: CLINIC | Age: 32
End: 2017-07-13

## 2017-07-13 VITALS
OXYGEN SATURATION: 99 % | SYSTOLIC BLOOD PRESSURE: 112 MMHG | HEART RATE: 72 BPM | RESPIRATION RATE: 16 BRPM | HEIGHT: 60 IN | BODY MASS INDEX: 22.38 KG/M2 | WEIGHT: 114 LBS | DIASTOLIC BLOOD PRESSURE: 79 MMHG

## 2017-07-13 DIAGNOSIS — R07.89 OTHER CHEST PAIN: ICD-10-CM

## 2017-07-13 DIAGNOSIS — Z98.890 OTHER SPECIFIED POSTPROCEDURAL STATES: ICD-10-CM

## 2017-07-13 DIAGNOSIS — Z86.79 OTHER SPECIFIED POSTPROCEDURAL STATES: ICD-10-CM

## 2017-07-13 RX ORDER — CYCLOBENZAPRINE HYDROCHLORIDE 10 MG/1
10 TABLET, FILM COATED ORAL
Qty: 30 | Refills: 0 | Status: DISCONTINUED | COMMUNITY
Start: 2017-01-24

## 2017-07-13 RX ORDER — FLUTICASONE PROPIONATE 0.5 MG/G
0.05 CREAM TOPICAL
Qty: 60 | Refills: 0 | Status: DISCONTINUED | COMMUNITY
Start: 2017-01-20

## 2017-07-13 RX ORDER — CLINDAMYCIN PHOSPHATE 1 G/10ML
1 GEL TOPICAL
Qty: 60 | Refills: 0 | Status: DISCONTINUED | COMMUNITY
Start: 2017-01-06

## 2017-07-13 RX ORDER — METHYLPREDNISOLONE 4 MG/1
4 TABLET ORAL
Qty: 21 | Refills: 0 | Status: DISCONTINUED | COMMUNITY
Start: 2017-02-21

## 2017-07-13 RX ORDER — IPRATROPIUM BROMIDE 17 UG/1
17 AEROSOL, METERED RESPIRATORY (INHALATION)
Qty: 12 | Refills: 0 | Status: DISCONTINUED | COMMUNITY
Start: 2017-01-03

## 2017-07-13 RX ORDER — BUSPIRONE HYDROCHLORIDE 10 MG/1
10 TABLET ORAL
Qty: 90 | Refills: 0 | Status: DISCONTINUED | COMMUNITY
Start: 2017-01-17

## 2017-07-13 RX ORDER — SIMVASTATIN 20 MG/1
20 TABLET, FILM COATED ORAL
Qty: 30 | Refills: 0 | Status: DISCONTINUED | COMMUNITY
Start: 2016-12-14

## 2017-07-13 RX ORDER — MINOCYCLINE HYDROCHLORIDE 75 MG/1
75 CAPSULE ORAL
Qty: 30 | Refills: 0 | Status: DISCONTINUED | COMMUNITY
Start: 2017-05-09 | End: 2017-07-13

## 2017-07-13 RX ORDER — MIRTAZAPINE 15 MG/1
15 TABLET, FILM COATED ORAL
Qty: 30 | Refills: 0 | Status: DISCONTINUED | COMMUNITY
Start: 2017-02-23

## 2017-07-13 RX ORDER — CHLORHEXIDINE GLUCONATE 4 %
325 (65 FE) LIQUID (ML) TOPICAL
Qty: 60 | Refills: 0 | Status: DISCONTINUED | COMMUNITY
Start: 2016-12-27

## 2017-07-13 RX ORDER — LIDOCAINE 5 G/100G
5 OINTMENT TOPICAL
Qty: 70 | Refills: 0 | Status: DISCONTINUED | COMMUNITY
Start: 2017-02-20

## 2017-07-13 RX ORDER — SODIUM CHLORIDE 0.65 %
0.65 AEROSOL, SPRAY (ML) NASAL
Qty: 50 | Refills: 0 | Status: DISCONTINUED | COMMUNITY
Start: 2017-02-20

## 2017-07-13 RX ORDER — PREDNISONE 10 MG/1
10 TABLET ORAL
Qty: 60 | Refills: 0 | Status: DISCONTINUED | COMMUNITY
Start: 2017-01-24

## 2017-07-13 RX ORDER — BENZOYL PEROXIDE 5 G/100G
5 LIQUID TOPICAL
Qty: 148 | Refills: 0 | Status: DISCONTINUED | COMMUNITY
Start: 2017-01-06

## 2017-07-13 RX ORDER — FLUOCINONIDE 0.5 MG/G
0.05 CREAM TOPICAL
Qty: 60 | Refills: 0 | Status: DISCONTINUED | COMMUNITY
Start: 2017-01-21

## 2017-07-13 RX ORDER — FLUCONAZOLE 150 MG/1
150 TABLET ORAL
Qty: 1 | Refills: 0 | Status: DISCONTINUED | COMMUNITY
Start: 2017-06-13

## 2017-07-13 RX ORDER — DOXYCYCLINE 100 MG/1
100 CAPSULE ORAL
Qty: 60 | Refills: 0 | Status: DISCONTINUED | COMMUNITY
Start: 2017-01-24

## 2017-07-13 RX ORDER — BUPRENORPHINE HYDROCHLORIDE, NALOXONE HYDROCHLORIDE 8; 2 MG/1; MG/1
8-2 FILM, SOLUBLE BUCCAL; SUBLINGUAL
Refills: 0 | Status: ACTIVE | COMMUNITY
Start: 2017-05-16

## 2017-07-13 RX ORDER — CLONAZEPAM 0.5 MG/1
0.5 TABLET ORAL
Qty: 30 | Refills: 0 | Status: DISCONTINUED | COMMUNITY
Start: 2017-02-23

## 2017-07-13 RX ORDER — GABAPENTIN 300 MG/1
300 CAPSULE ORAL
Qty: 90 | Refills: 0 | Status: DISCONTINUED | COMMUNITY
Start: 2017-02-23

## 2017-07-13 RX ORDER — ESOMEPRAZOLE MAGNESIUM 20 MG/1
20 CAPSULE, DELAYED RELEASE ORAL
Qty: 60 | Refills: 0 | Status: DISCONTINUED | COMMUNITY
Start: 2017-05-09 | End: 2017-07-13

## 2017-07-13 RX ORDER — METHOTREXATE 25 MG/ML
50 INJECTION, SOLUTION INTRA-ARTERIAL; INTRAMUSCULAR; INTRAVENOUS
Qty: 2 | Refills: 0 | Status: DISCONTINUED | COMMUNITY
Start: 2017-04-25 | End: 2017-07-13

## 2017-07-13 RX ORDER — CONJUGATED ESTROGENS AND MEDROXYPROGESTERONE ACETATE 0.625 (14)
0.625-5 KIT ORAL
Qty: 28 | Refills: 0 | Status: DISCONTINUED | COMMUNITY
Start: 2017-06-23

## 2017-07-13 RX ORDER — AZITHROMYCIN 250 MG/1
250 TABLET, FILM COATED ORAL
Qty: 6 | Refills: 0 | Status: DISCONTINUED | COMMUNITY
Start: 2017-01-24

## 2017-07-15 ENCOUNTER — NON-APPOINTMENT (OUTPATIENT)
Age: 32
End: 2017-07-15

## 2017-07-18 DIAGNOSIS — I47.1 SUPRAVENTRICULAR TACHYCARDIA: ICD-10-CM

## 2017-07-18 DIAGNOSIS — F17.210 NICOTINE DEPENDENCE, CIGARETTES, UNCOMPLICATED: ICD-10-CM

## 2017-07-18 DIAGNOSIS — R06.00 DYSPNEA, UNSPECIFIED: ICD-10-CM

## 2017-07-18 DIAGNOSIS — K21.9 GASTRO-ESOPHAGEAL REFLUX DISEASE WITHOUT ESOPHAGITIS: ICD-10-CM

## 2017-07-18 DIAGNOSIS — I73.00 RAYNAUD'S SYNDROME WITHOUT GANGRENE: ICD-10-CM

## 2017-07-18 DIAGNOSIS — M34.1 CR(E)ST SYNDROME: ICD-10-CM

## 2017-07-18 DIAGNOSIS — M32.9 SYSTEMIC LUPUS ERYTHEMATOSUS, UNSPECIFIED: ICD-10-CM

## 2017-07-18 RX ORDER — BUPRENORPHINE AND NALOXONE 2; .5 MG/1; MG/1
2 TABLET SUBLINGUAL
Qty: 0 | Refills: 0 | COMMUNITY

## 2017-07-25 ENCOUNTER — APPOINTMENT (OUTPATIENT)
Dept: ELECTROPHYSIOLOGY | Facility: CLINIC | Age: 32
End: 2017-07-25

## 2018-07-16 PROBLEM — M34.1 CR(E)ST SYNDROME: Chronic | Status: INACTIVE | Noted: 2017-02-03 | Resolved: 2017-07-05

## 2019-09-02 PROBLEM — Z09 FOLLOW UP: Status: ACTIVE | Noted: 2017-02-28

## 2019-11-27 PROBLEM — Z28.21 INFLUENZA VACCINATION DECLINED: Status: RESOLVED | Noted: 2017-02-13 | Resolved: 2019-11-27

## 2023-05-05 NOTE — PATIENT PROFILE ADULT. - TEACHING/LEARNING FACTORS INFLUENCE READINESS TO LEARN
Emergency Department Provider Note       PCP: None None   Age: 38 y.o. Sex: male     DISPOSITION Decision To Discharge 05/05/2023 10:15:18 AM       ICD-10-CM    1. History of diarrhea  Z87.898       2. History of vomiting  Z87.898           Medical Decision Making     Complexity of Problems Addressed:      Data Reviewed and Analyzed:  Category 1:   I independently ordered and reviewed each unique test.         Category 2:       Category 3: Discussion of management or test interpretation. The rapid strep test is negative on father but positive on his child as suspected. Patient's symptoms are essentially resolved at this time. He does not require any further treatment and likely had a viral gastroenteritis his abdomen soft nontender and I doubt appendicitis or any acute intra-abdominal surgical or medical pathology at this time  He is not dehydrated has a moist mucous membranes  Risk of Complications and/or Morbidity of Patient Management:         History      Seth Nichols is a 38 y.o. male who presents to the Emergency Department with chief complaint of    Chief Complaint   Patient presents with    Fatigue    Headache      64-year-old male brings in his 10year-old son due to both being ill over the last few days. 2 days ago patient fell at the nausea vomiting and diarrhea and symptoms now resolved. His son had vomiting 2 days ago and developed sore throat. He was seen in urgent care and had negative flu and COVID but not tested for strep. Mono was mentioned in the father is concerned about mono. He is concerned because he has other children at home         Review of Systems   Constitutional:  Negative for chills and fever. Gastrointestinal:  Negative for abdominal pain.      Physical Exam     Vitals signs and nursing note reviewed:  Vitals:    05/05/23 0918   BP: (!) 133/96   Pulse: 97   Resp: 17   Temp: 98.1 °F (36.7 °C)   TempSrc: Oral   SpO2: 96%   Weight: 197 lb (89.4 kg)   Height: 6' (1.829 m) none

## 2023-08-29 PROBLEM — E28.8 OTHER OVARIAN DYSFUNCTION: Chronic | Status: ACTIVE | Noted: 2017-07-05

## 2023-08-29 PROBLEM — I73.00 RAYNAUD'S SYNDROME WITHOUT GANGRENE: Chronic | Status: ACTIVE | Noted: 2017-07-05

## 2023-08-29 PROBLEM — M32.9 SYSTEMIC LUPUS ERYTHEMATOSUS, UNSPECIFIED: Chronic | Status: ACTIVE | Noted: 2017-02-03

## 2023-08-29 PROBLEM — M34.1 CR(E)ST SYNDROME: Chronic | Status: ACTIVE | Noted: 2017-07-05

## 2023-08-30 ENCOUNTER — APPOINTMENT (OUTPATIENT)
Dept: OTOLARYNGOLOGY | Facility: CLINIC | Age: 38
End: 2023-08-30
Payer: MEDICAID

## 2023-08-30 VITALS
BODY MASS INDEX: 22.38 KG/M2 | HEIGHT: 60 IN | WEIGHT: 114 LBS | DIASTOLIC BLOOD PRESSURE: 78 MMHG | HEART RATE: 80 BPM | SYSTOLIC BLOOD PRESSURE: 111 MMHG

## 2023-08-30 DIAGNOSIS — H93.8X9 OTHER SPECIFIED DISORDERS OF EAR, UNSPECIFIED EAR: ICD-10-CM

## 2023-08-30 DIAGNOSIS — R00.2 PALPITATIONS: ICD-10-CM

## 2023-08-30 DIAGNOSIS — R06.89 OTHER ABNORMALITIES OF BREATHING: ICD-10-CM

## 2023-08-30 PROCEDURE — 31575 DIAGNOSTIC LARYNGOSCOPY: CPT

## 2023-08-30 PROCEDURE — 92557 COMPREHENSIVE HEARING TEST: CPT

## 2023-08-30 PROCEDURE — 92567 TYMPANOMETRY: CPT

## 2023-08-30 PROCEDURE — 99204 OFFICE O/P NEW MOD 45 MIN: CPT | Mod: 25

## 2023-08-30 RX ORDER — METHOCARBAMOL 750 MG/1
TABLET, FILM COATED ORAL
Refills: 0 | Status: ACTIVE | COMMUNITY

## 2023-08-30 RX ORDER — ALPRAZOLAM 2 MG/1
TABLET ORAL
Refills: 0 | Status: ACTIVE | COMMUNITY

## 2023-08-30 RX ORDER — TRAZODONE HYDROCHLORIDE 300 MG/1
TABLET ORAL
Refills: 0 | Status: ACTIVE | COMMUNITY

## 2023-08-30 RX ORDER — ALBUTEROL SULFATE 90 UG/1
108 (90 BASE) AEROSOL, METERED RESPIRATORY (INHALATION)
Qty: 18 | Refills: 0 | Status: COMPLETED | COMMUNITY
Start: 2016-12-14 | End: 2023-08-30

## 2023-08-30 RX ORDER — CONJUGATED ESTROGENS AND MEDROXYPROGESTERONE ACETATE .625; 2.5 MG/1; MG/1
0.625-2.5 TABLET, SUGAR COATED ORAL DAILY
Refills: 0 | Status: COMPLETED | COMMUNITY
Start: 2017-06-30 | End: 2023-08-30

## 2023-08-30 NOTE — PHYSICAL EXAM
[] : septum deviated to the left [Midline] : trachea located in midline position [Normal] : no rashes [FreeTextEntry1] : bilateral hearing loss and tinnitus [de-identified] : hypertrophy

## 2023-08-30 NOTE — REASON FOR VISIT
[Initial Evaluation] : an initial evaluation for [FreeTextEntry2] : here for bilateral hearing loss, bilateral tinnitus

## 2023-08-30 NOTE — DATA REVIEWED
[de-identified] : 8/30/23 both ears severe CHL throughout, type A poor reliability due to inconsistent response from pt

## 2023-08-30 NOTE — CONSULT LETTER
[Dear  ___] : Dear  [unfilled], [Courtesy Letter:] : I had the pleasure of seeing your patient, [unfilled], in my office today. [Please see my note below.] : Please see my note below. [Sincerely,] : Sincerely, [FreeTextEntry2] : Elkin Allan [FreeTextEntry3] : Roosevelt Douglas MD, TOÑO, FACS  Department Otolaryngology Director of [facility] Professor of Otolaryngology,  [employer] [facility]

## 2023-08-30 NOTE — HISTORY OF PRESENT ILLNESS
[de-identified] : 38 year old female here for bilateral hearing loss, bilateral tinnitus.  States bilateral hearing loss since a child, left worse than right.  Denies hearing aids.  States hearing loss has gotten worse.  States bilateral tinnitus, whooshing and heartbeat sound for years, occurs about every other day.  Crackling and popping sounds started about 6 months ago.  States intermittent bilateral otalgia, left worse than right for years,  Patient denies otorrhea.   States frontal sinus headaches, and behind the ears.  States has difficulty breathing through the nose.

## 2023-08-30 NOTE — PROCEDURE
[Image(s) Captured] : image(s) captured and filed [Video Captured] : video captured and filed [Unable to Cooperate with Mirror] : patient unable to cooperate with mirror [Complicated Symptoms] : complicated symptoms requiring more thorough examination than provided by mirror [Topical Lidocaine] : topical lidocaine [Oxymetazoline HCl] : oxymetazoline HCl [Flexible Endoscope] : examined with the flexible endoscope [Serial Number: ___] : Serial Number: [unfilled] [Normal] : posterior cricoid area had healthy pink mucosa in the interarytenoid area and the esophageal inlet [de-identified] : Patient was placed in the examination chair in a sitting position. The nose was decongested with oxymetazoline nasal solution. The airway was anesthetized with 4% Xylocaine.  The back of the throat was anesthetized with Cetacaine. Direct flexible/rigid video endoscopy was performed. Findings revealed:  Pt has deviated septum to the L, turbinate hypertrophy, beginnings of septal perforation in mucosa L side, larynx epiglottis and vocal cords normal.

## 2023-09-16 ENCOUNTER — OUTPATIENT (OUTPATIENT)
Dept: OUTPATIENT SERVICES | Facility: HOSPITAL | Age: 38
LOS: 1 days | End: 2023-09-16
Payer: MEDICAID

## 2023-09-16 ENCOUNTER — APPOINTMENT (OUTPATIENT)
Dept: CT IMAGING | Facility: CLINIC | Age: 38
End: 2023-09-16
Payer: MEDICAID

## 2023-09-16 DIAGNOSIS — R51.9 HEADACHE, UNSPECIFIED: ICD-10-CM

## 2023-09-16 PROCEDURE — 70486 CT MAXILLOFACIAL W/O DYE: CPT | Mod: 26

## 2023-09-16 PROCEDURE — 70486 CT MAXILLOFACIAL W/O DYE: CPT

## 2023-09-20 NOTE — ED PROVIDER NOTE - PRINCIPAL DIAGNOSIS
[facility] Progress Note           Michel Magallon Patient Status:  Inpatient    1959 MRN [id number]   Location [facility] SURGICAL HEART UNIT Attending Mik Castro MD   Hosp Day # 9 PCP J Carlos Pastor MD     Subjective:      Chief complaint: Inferior STEMI; S/P PCI to RCA; Multivessel disease/Acute HFrEF    S: Patient eating breakfst. He denies chest pain, shortness of breath, or palpitations. Left fem IABP 1:1; On dobutamine support.     Review of Systems  General: afebrile; No acute distress  RS: No shortness of breath or hemoptysis  CV: No chest pain or palpitations  GI:  No hematochezia, no melena  : No urinary disturbance  Derm: No skin disorders   Heme: No blood dyscrasias.  Remainder of 12 point systems reviewed and negative (or as mentioned in HPI)      Objective:     Medications:  Current Facility-Administered Medications   Medication Dose Route Frequency Provider Last Rate Last Admin   • pantoprazole (PROTONIX) EC tablet 40 mg  40 mg Oral 2 times per day Arlene Bronson MD       • sodium chloride (PF) 0.9 % injection 2 mL  2 mL Intracatheter 2 times per day Eliu Campo MD   2 mL at 23 0925   • aspirin chewable 81 mg  81 mg Oral Daily Eliu Campo MD   81 mg at 23 0850   • ticagrelor (BRILINTA) tablet 90 mg  90 mg Oral 2 times per day Eliu Campo MD   90 mg at 23 0925   • polyethylene glycol (MIRALAX) packet 17 g  17 g Oral Daily Norman Eddy DO   17 g at 23 0850   • furosemide (LASIX INJECT) injection 20 mg  20 mg Intravenous Q8H Wing Multani MD   20 mg at 23 0547   • fluticasone-umeclidin-vilanterol (TRELEGY ELLIPTA) 100-62.5-25 MCG/ACT inhaler 1 puff  1 puff Inhalation Daily Resp Concha Solomon MD   1 puff at 23   • rosuvastatin (CRESTOR) tablet 40 mg  40 mg Oral Nightly Star Stanton MD   40 mg at 23   • potassium CHLORIDE (KLOR-CON) packet 40 mEq  40 mEq Per NG Tube Daily  with breakfast Lisette Massey NP   40 mEq at 09/20/23 0850   • insulin lispro (ADMELOG,HumaLOG) - Correction Dose   Subcutaneous TID WC Lisette Massey NP   1 Units at 09/20/23 0849   • insulin lispro (ADMELOG,HumaLOG) - Correction Dose   Subcutaneous Nightly Lisette Massey NP   2 Units at 09/14/23 2026   • docusate sodium (COLACE) capsule 100 mg  100 mg Oral BID Mik Castro MD   100 mg at 09/19/23 2032   • Potassium Standard Replacement Protocol (Levels 3.5 and lower)   Does not apply See Admin Instructions Lisette Massey NP       • Potassium Replacement (Levels 3.6 - 4)   Does not apply See Admin Instructions Lisette Massey NP   20 mEq at 09/12/23 0843   • Magnesium Standard Replacement Protocol   Does not apply See Admin Instructions Lisette Massey NP       • docusate sodium-sennosides (SENOKOT S) 50-8.6 MG 2 tablet  2 tablet Oral BID Lisette Massey NP   2 tablet at 09/20/23 0850   • potassium CHLORIDE (KLOR-CON) packet 40 mEq  40 mEq Per NG Tube Once Gil Pace MD       • potassium CHLORIDE 20 mEq/100mL IVPB premix  20 mEq Intravenous Once Gil Pace MD        Followed by   • potassium CHLORIDE 20 mEq/100mL IVPB premix  20 mEq Intravenous Once Gil Pace MD          Current Facility-Administered Medications   Medication Dose Route Frequency Provider Last Rate Last Admin   • DOBUTamine (DOBUTREX) 500 mg/250 mL dextrose 5 % infusion  3 mcg/kg/min (Dosing Weight) Intravenous Continuous Star Stanton MD 8.6 mL/hr at 09/20/23 1059 3 mcg/kg/min at 09/20/23 1059   • heparin (porcine) 25,000 units/250 mL in dextrose 5 % infusion  1-30 Units/kg/hr (Dosing Weight) Intravenous Continuous Trina Ambrocio NP 18.1 mL/hr at 09/20/23 1059 19 Units/kg/hr at 09/20/23 1059   • heparin 2 unit/mL in NaCL 0.9% solution  3 mL/hr Other Continuous Arlene Bronson MD 3 mL/hr at 09/20/23 1059 3 mL/hr at 09/20/23 1059   • dextrose 5 % / sodium chloride 0.45% with KCl  40 mEq infusion   Intravenous Continuous Arlene Bronson MD 10 mL/hr at 09/20/23 1059 Rate Verify at 09/20/23 1059   • sodium chloride 0.9% infusion   Intravenous Continuous PoleLisette NP 3 mL/hr at 09/20/23 1059 Rate Verify at 09/20/23 1059   • sodium chloride 0.9% infusion   Intravenous Continuous PoleLisette, NP 10 mL/hr at 09/20/23 1059 Rate Verify at 09/20/23 1059   • sodium chloride 0.9% infusion   Intravenous Continuous PoleLisette, NP 20 mL/hr at 09/20/23 1059 Rate Verify at 09/20/23 1059   • dextrose 5 % / sodium chloride 0.45% infusion   Intravenous Continuous Lisette Massey NP   Stopped at 09/12/23 1640   • dextrose 5 % infusion   Intravenous Continuous PoleLisette NP 3 mL/hr at 09/20/23 1059 Rate Verify at 09/20/23 1059   • NORepinephrine (LEVOPHED) 8 mg/250 mL in dextrose 5 % infusion  0-100 mcg/min Intravenous Continuous Lisette Massey NP   Completed at 09/18/23 1932   • vasopressin (VASOSTRICT) 20 unit/100 mL dextrose 5 % infusion  0-0.1 Units/min Intravenous Continuous Lisette Massey NP   Completed at 09/16/23 1132      Current Facility-Administered Medications   Medication Dose Route Frequency Provider Last Rate Last Admin   • acetaminophen (TYLENOL) suppository 650 mg  650 mg Rectal Q4H PRN Eliu Campo MD       • sodium chloride 0.9 % flush bag 25 mL  25 mL Intravenous PRN Eliu Campo MD       • baclofen (LIORESAL) tablet 10 mg  10 mg Oral BID PRN Norman Eddy DO   10 mg at 09/18/23 1758   • potassium CHLORIDE (KLOR-CON M) reynold ER tablet 40 mEq  40 mEq Oral Q4H PRN Josiah To MD   40 mEq at 09/15/23 1843   • traZODone (DESYREL) tablet 50 mg  50 mg Oral Nightly PRN Mik Castro MD   50 mg at 09/20/23 0408   • magnesium hydroxide (MILK OF MAGNESIA) 400 MG/5ML suspension 30 mL  30 mL Oral Daily PRN Mik Castro MD   30 mL at 09/18/23 1757   • bisacodyl (DULCOLAX) suppository 10 mg  10 mg Rectal Daily PRN Mik Castro MD   10 mg at  09/20/23 0547   • heparin (porcine) injection 4,000 Units  4,000 Units Intravenous PRN Trina Ambrocio NP   4,000 Units at 09/15/23 0430   • heparin (porcine) injection 2,000 Units  2,000 Units Intravenous PRN Trina Ambrocio NP   2,000 Units at 09/20/23 0246   • ondansetron (ZOFRAN ODT) disintegrating tablet 4 mg  4 mg Oral Q12H PRN Lisette Massey NP        Or   • ondansetron (ZOFRAN) injection 4 mg  4 mg Intravenous Q12H PRN Lisette Massey NP   4 mg at 09/18/23 1757   • acetaminophen (TYLENOL) tablet 650 mg  650 mg Oral Q4H PRN Lisette Massey NP   650 mg at 09/14/23 0627   • potassium CHLORIDE 20 MEQ/50ML IVPB premix 20 mEq  20 mEq Intravenous PRN Gil Pace MD   Completed at 09/20/23 0650   • potassium CHLORIDE 40 mEq/100 mL IVPB premix  40 mEq Intravenous PRN Gil Pace MD   Completed at 09/19/23 0808   • magnesium sulfate 2 g in 50 mL premix IVPB  2 g Intravenous PRN Gil Pace MD       • dextrose 50 % injection 25 g  25 g Intravenous PRN Gil Pace MD       • dextrose 50 % injection 12.5 g  12.5 g Intravenous PRN Gil Pace MD       • glucagon (GLUCAGEN) injection 1 mg  1 mg Intramuscular PRN Gil Pace MD       • dextrose (GLUTOSE) 40 % gel 15 g  15 g Oral PRN Gil Pace MD       • dextrose (GLUTOSE) 40 % gel 30 g  30 g Oral PRN Gil Pace MD            Allergies:   ALLERGIES:   Allergen Reactions   • Latex   (Environmental) HIVES        Physical Exam:  Vital Last Value 24 Hour Range   Temperature 98.8 °F (37.1 °C) (09/20/23 1200) Temp  Min: 98.6 °F (37 °C)  Max: 99.7 °F (37.6 °C)   Pulse 97 (09/20/23 1200) Pulse  Min: 88  Max: 103   Respiratory (!) 20 (09/20/23 1200) Resp  Min: 12  Max: 28   Non-Invasive  Blood Pressure 115/71 (09/11/23 1930) No data recorded   Pulse Oximetry 97 % (09/20/23 1200) SpO2  Min: 94 %  Max: 99 %   Arterial   Blood Pressure 114/48 (09/20/23 1200) Arterial Line BP  Min: 95/57  Max: 143/54      6  CVP (mmHg)                  PAP (mmHg)   18/11   21/14  19/12  22/12  18/11  PAP (mmHg)     PCWP (mmHg)   12         11  PCWP (mmHg)     PA Mean (mmHg)   15   17  4  17  14  PA Mean (mmHg)     Cardiac Output (l/min)   5.5         5.3  Cardiac Output (l/min)     Cardiac Index (l/min/m2)   2.6         2.5  Cardiac Index (l/min/m2)     PVR (dyne*sec)/cm5 (calculated)   58         76  PVR (dyne*sec)/cm5 (calculated)     SVR (dyne*sec)/cm5   1130         1277  SVR (dyne*sec)/cm5     SVR (dyne*sec)/cm5 (calculated)   1090.91         1101.89        Tele: Sinus rhythm rate 80-90 with occasional PVC's    Intake/Output:     Intake/Output Summary (Last 24 hours) at 9/20/2023 1225  Last data filed at 9/20/2023 1159  Gross per 24 hour   Intake 2083.8 ml   Output 3200 ml   Net -1116.2 ml       Weight    09/13/23 0600 09/16/23 0600 09/18/23 0518 09/19/23 0600   Weight: 86.3 kg (190 lb 4.1 oz) 87 kg (191 lb 12.8 oz) 82.8 kg (182 lb 8.7 oz) 85.5 kg (188 lb 7.9 oz)        GENERAL: No acute distress  HEENT: Normocephalic. EOMI  Neck:  Supple neck. No JVD noted  Oral mucosa : Pink and moist.    Endocrine: There is no goiter.  CVS: Regular rate and rhythm.  Normal first and second heart tones. No murmur appreciated. IABP 1:1 to left fem with good augmentation; Right groin site without hematoma or bruit  Lung fields: Diminished breath sounds bilaterally. On RA   GI: Soft. Nontender, BS+  Lower extremity: No cyanosis, clubbing or edema.   Peripheral vascular: Both lower extremities are warm and well perfused. Doppler signals noted to bilateral DP/PT/AT   Neuro: Awake and alert.  Nonfocal examination.  Psych: Appropriate mood and affect  Integumentary: Warm and Dry      Clinical Data:   (PERSONALLY REVIEWED)    Labs    CBC  Recent Labs   Lab 09/20/23  0345 09/19/23  1219 09/19/23  0324   WBC 15.4* 13.3* 13.1*   HCT 34.7* 36.3* 38.0*   HGB 11.5* 12.2* 12.8*    326 329       CMP  Recent Labs   Lab 09/20/23  0345 09/19/23  1212  09/19/23  0324   SODIUM 139 135 135   POTASSIUM 3.8 4.7 3.8   CHLORIDE 107 106 105   CO2 23 23 23   GLUCOSE 175* 199* 163*   BUN 26* 28* 30*   CREATININE 0.81 0.97 0.83   CALCIUM 8.1* 8.2* 8.8   TOTPROTEIN 6.7 6.8 6.9   ALBUMIN 2.6* 2.5* 2.7*   BILIRUBIN 1.2* 0.9 1.3*   AST 33 33 41*   GPT 47 46 55   ALKPT 84 83 88       Cardiac Labs  Recent Labs   Lab 09/20/23  0345 09/19/23  0324 09/18/23  0252 09/16/23  0336 09/15/23  0311 09/14/23  1027   HTROPI  --   --   --   --  23,082* 31,723*   NTPROB 2,573* 3,360* 3,019*   < > 5,104*  --     < > = values in this interval not displayed.       Lipid Panel  9/11/23  Coags  Recent Labs   Lab 09/20/23  0926 09/20/23  0345 09/20/23  0047 09/19/23  1558 09/19/23  0327 09/18/23  2310 09/18/23  1705   INR  --  1.2  --   --  1.2  --  1.1   PTT 64*  --  35* 84* 55*   < > 53*    < > = values in this interval not displayed.       ABG  Recent Labs   Lab 09/17/23  0631 09/17/23  0344 09/16/23  1500   RAPH 7.47* 7.44 7.49*   RAPCO2 35 35 36   RAPO2 74* 84 78*   RAHCO3 26 24 27   RASAT 97 99 98       Imaging  CXR 9/20/23  1.   Mild perihilar and upper lobe interstitial opacities right side  greater than left may represent interstitial edema  2.   No lobar consolidative opacity to suggest pneumonia  3.   There is no identifiable pneumothorax/pneumomediastinum.     TUBES/LINES/DEVICES:  1.   Snyder-Tristian catheter from the right internal jugular approach terminates  in the main pulmonary artery oriented to the right  2.   IAPB marker projects 4.8 cm from the top of the aortic arch  3.   Previously observed temporary pacemaker electrode is been removed      Arterial Duplex Ultrasound LE 9/15/23   IMPRESSION:  1.   Infrarenal abdominal aortic aneurysm measuring up to 4.3 cm.  2.   Mild diffuse calcified iliofemoral atherosclerosis without high-grade  focal stenosis or occlusion.       CT Chest abd and pelvis 9/13/23  IMPRESSION:  Intra-aortic balloon pump. The heart is mildly enlarged.       Bilateral lung diffuse interstitial and airspace opacities may represent  edema.      Coronary artery calcifications.     No hydronephrosis. No bowel obstruction.      4.5 cm infrarenal abdominal aortic aneurysm.      Sigmoid diverticulosis. Prostate gland is borderline enlarged.        ECG:   Encounter Date: 09/10/23   Electrocardiogram 12-Lead   Result Value    Ventricular Rate EKG/Min (BPM) 104    Atrial Rate (BPM) 104    HI-Interval (MSEC) 164    QRS-Interval (MSEC) 90    QT-Interval (MSEC) 376    QTc 495    P Axis (Degrees) 60    R Axis (Degrees) -66    T Axis (Degrees) 112    REPORT TEXT      Sinus tachycardia  with occasional  premature ventricular complexes  Left axis deviation  Inferior infarct  , possibly acute  ** ** ACUTE MI / STEMI ** **  Consider right ventricular involvement in acute inferior infarct  Abnormal ECG  When compared with ECG of  10-SEP-2023 10:11,  Significant changes have occurred  Confirmed by SHARRI GAONA DO ([id number]) on 9/10/2023 6:06:55 PM          Echocardiogram:    TTE 9/11/23  1. Left ventricle: The cavity size is normal. Wall thickness is mildly  increased. Systolic function is mildly reduced. The ejection fraction was  measured by visual estimation. Grade I diastolic dysfunction. The ejection  fraction is 40%.  2. Right ventricle: The cavity size is normal. Systolic function is reduced.      Cardiac cath:   Results for orders placed or performed during the hospital encounter of 09/11/23   Cath/PV Case    Narrative    [facility] [place] [facility]   CARDIAC CATHETERIZATION REPORT    DATE of PROCEDURE:  9/19/2023    PROCEDURES PERFORMED:    1.  Diagnostic left heart catheterization   2.  Selective coronary angiography  3.  Hemodynamic assessment  4.  Right lower extremity angiogram  5.  Removal of the intra aortic balloon pump from the right femoral artery  6.  Ultrasound-guided access of the left femoral artery  7.  Insertion of an intra-aortic balloon pump via left femoral artery  8.  IVUS  of the left main/LAD  9.  PCI of the left main/LAD  10.  Balloon angioplasty of the right common iliac/right external iliac   artery  11.  IVUS of the right common iliac, external iliac artery  11.  Aspiration thrombectomy of the right common iliac/right external   iliac artery  12.  Stenting of the right common iliac/right external iliac artery  13.  Perclose of the right femoral arteriotomy  14.  Removal of the temporary transvenous pacer.    CONSCIOUS SEDATION: Sedation was supported by anesthesiologist please   refer to the records    APPROACH:  Under sterile draping and local anesthesia, with ultrasound and   fluoroscopic guidance access to the left femoral artery was obtained and a   6 Hong Konger sheath was placed.  We then tried to wire the intra-aortic   balloon pump however there was a kink in the intra-aortic balloon pump and   the balloon pump could not be wired we pulled out the balloon pump.  An   angiogram showed total occlusion of the right external iliac and common   iliac artery.  We were able to navigate a wire through the aorta.    COMPLICATIONS:  None.  Patient tolerated the procedure well.  No access   site hematoma was present and distal pulses were intact.  The patient left   the Catheterization Lab in stable condition.      CORONARY ARTERIES: Please refer to previous reports for complete coronary   anatomy tree    PROCEDURAL MEDICATIONS      Total units Heparin given: 7000    VESSEL DILATED: Left main/LAD  SUPPLIES UTILIZED:      Guiding Catheter: XB 3.5      Guide Wire: Javid blue      Balloon Dilatation Catheter: 3.5 mm NC, 4.5 mm NC, 5.0 mm NC      Stent: 4.0 mm x 32 mm   Pre Intervention PAUL Flow: 3   Post Intervention PAUL Flow: 3   Residual Stenosis: 0%      PROCEDURE IN DETAIL: After gaining access with ultrasound and fluoroscopic   guidance on the left femoral artery we then tried to wire the intra-aortic   balloon pump however due to the kink in the catheter the intra-aortic   balloon  pump was not able to be wired we then remove the intra-aortic   balloon pump and performed an angiogram which showed occlusion of the   right external iliac with heavy thrombus burden and possible dissection.    We were able to navigate a 035 wire and ballooned the track we then   decided to perform PCI after exchanging the sheath within 9 Haitian sheath.    An XB 3.5 guide catheter was used and we wired the lesion with a Javid   blue wire we then performed balloon angioplasty with a 3 5 mm NC with good   expansion we noticed a stent in the proximal LAD we performed IVUS and   then deployed a 4 mm x 32 mm SARAY covering the ostium of the left main.    The whole stented area was postdilated with a 4.5 NC and a proximal   optimization technique was done with a 5 mm NC balloon.  We then performed   IVUS which showed good stent expansion and coverage of the ostium.  We   then switched our focus to the right common iliac/external iliac artery.    We performed IVUS for sizing after balloon angioplasty with a 6 mm and 8   mm balloon we noticed significant thrombus in the right common iliac   artery there is also some residual thrombus in the right profunda.  We   performed mechanical thrombectomy with lightening 7 penumbra catheter.  We   then stented the common iliac artery with a balloon expandable stent and   then used to self-expandable stents in an overlapping fashion to cover the   external right iliac artery.  Angiogram revealed good flow all the way to   the infra pop arteries.  At this point the sheath was removed and Perclose   was applied.  The 6 Haitian sheath in the right common femoral artery was   exchanged for an 8 Haitian sheath and the intra-aortic balloon pump was   placed.    MANUAL HOLD/CLOSURE DEVICE/SHEATHS IN PLACE: Perclose    CONCLUSIONS:  1.  Successful IVUS guided PCI of the left main and LAD with a 4.0 mm x 32   mm SARAY  2.  Balloon angioplasty of the right common iliac and right external iliac    artery with stenting secondary to significant thrombus and possible   dissection  3.  Removal of the intra-aortic balloon pump from the right femoral artery   and placement of a new intra-aortic balloon pump in the left femoral   artery    RECOMMENDATIONS:  1.  Aspirin, switch Plavix to ticagrelor, statin, aggressive risk factor   modifications, cardiac rehab  2.  Continue with intra-aortic balloon pump and IV heparin  3.  Transfer back to adult surgical heart unit removal of the 6 Macedonian   right femoral venous sheath at bedside    Thank you for allowing us to participate in the care of your patient. If   you have any further questions, please do not hesitate to contact me.          Assessment and Plan:      63 year old male with history of hyperlipidemia and coronary artery disease transferred from [facility] with inferior STEMI.  He had PCI of the RCA and the balloon pump was placed patient has 90% distal left main stenosis involving the ostial of the LAD with chronically totally occluded circumflex. Pt was hypotensive and developed AF with DCCV x 2 during the procedure. EF 20%-->40%. He has been evaluated by CT surgery for surgical revascularization, CT surgery has requested interventional cardiology input for possible PCI.     Inferior STEMI  Severe coronary artery disease  -S/P PCI of RCA with IABP placement (9/10/23); Distal LM 90% with ostial LAD and  of Circumflex  -Hypotensive and Afib with RVR during cath procedure requiring DCCV x 2(9/10/23).   -S/P PCI LM/LAD with SARAY 9/19/23  -S/P removal of right fem IABP (9/19)  -S/P insertion of left fem IABP now at 1:1 with good augmentation; Position noted on CXR.  -Remains on Dobutamine  -Continue with aspirin, ticagrelor, and statin therapy    PAD  -LE arterial duplex showed  Mild diffuse calcified iliofemoral atherosclerosis without high-grade focal stenosis or occlusion.  -S/P PTA of RCIA and REIA; S/P aspiration thrombectomy and stenting of RCIA and  REIA  -Right groin site without hematoma or bruit.   -Doppler signals to bilateral DP/PT/AT; Right foot warm to touch  -continue aspirin, ticagrelor and statin therapy    Cardiogenic Shock   Ischemic cardiomyopathy/RV failure  -Echo with improved EF to 40% and preserved RV systolic function with IABP, Inotrope support  -NYHA IV/Class C  -Volume status improving; Net neg 350ml/24 hours  Guideline Directed Medical Therapy for LVEF ? 40%   (NOTE: if not on, document the contraindication)  ARNI/ACEI/ARB: None currently  BB: None currently  SGLT2-I: None current  Hydralazine/Nitrates for African Americans: Not currently  MRA: None  ICD (LVEF ? 35%) or CRT-D (LVEF ? 35% with QRS >150 and LBBB): NA  DOAC for Non-Valvular AF/AFL: NA  Inotrope: Dobutamine   Diuretic: Lasix infusion 20 IVP q 8  0 Minutes of HF education (diet, activity, fluid restriction, and medications) provided.  -AHF managing    Paroxysmal Atrial Fibrillation-CHA2DS2vasc=3  -S/P DCCV x 2 in the setting of acute STEMI  -Currently in sinus rhythm  -Not on beta blocker due to pressor support  -Heparin infusion     Infrarenal Abdominal Aortic Aneurysm  -CT abdomen/Pelvis 9/13:  4.5 cm infrarenal abdominal aortic aneurysm.   -Arterial duplex 9/15: Infrarenal abdominal aortic aneurysm measuring up to 4.3 cm.    [facility]   -Baseline < 1.0  -Back to baseline  -Monitor with IV lasix    Hemoptysis-Resolved  Hiccups/Nausea  -Coughing up blood-Hemoglobin remains > 12  -Guaic stool negative  -Stool softners   -GI Evaluated and no evidence of GI bleed    Acute Respiratory Failure-Resolved  -Intubated prior to transfer  -S/P extubated 9/12  -On RA    Leukocytosis-Resolved    Plan of care reviewed with Dr. Campo and Gualberto, RN    Thank you for allowing us to participate in this patient's care.  Please do not hesitate to call with any questions or concerns.    Mady Chisholm, MSN, CCNS, APRN-FPA  [facility]  9/20/23   Shortness of breath

## 2023-09-22 ENCOUNTER — APPOINTMENT (OUTPATIENT)
Dept: OTOLARYNGOLOGY | Facility: CLINIC | Age: 38
End: 2023-09-22

## 2023-10-05 ENCOUNTER — NON-APPOINTMENT (OUTPATIENT)
Age: 38
End: 2023-10-05

## 2023-10-06 ENCOUNTER — APPOINTMENT (OUTPATIENT)
Dept: OTOLARYNGOLOGY | Facility: CLINIC | Age: 38
End: 2023-10-06
Payer: MEDICAID

## 2023-10-06 VITALS
SYSTOLIC BLOOD PRESSURE: 123 MMHG | BODY MASS INDEX: 20.62 KG/M2 | HEIGHT: 60 IN | WEIGHT: 105 LBS | HEART RATE: 88 BPM | DIASTOLIC BLOOD PRESSURE: 88 MMHG

## 2023-10-06 DIAGNOSIS — Z86.79 PERSONAL HISTORY OF OTHER DISEASES OF THE CIRCULATORY SYSTEM: ICD-10-CM

## 2023-10-06 DIAGNOSIS — M34.1 CR(E)ST SYNDROME: ICD-10-CM

## 2023-10-06 DIAGNOSIS — J34.2 DEVIATED NASAL SEPTUM: ICD-10-CM

## 2023-10-06 DIAGNOSIS — E78.00 PURE HYPERCHOLESTEROLEMIA, UNSPECIFIED: ICD-10-CM

## 2023-10-06 DIAGNOSIS — R51.9 HEADACHE, UNSPECIFIED: ICD-10-CM

## 2023-10-06 DIAGNOSIS — F17.200 NICOTINE DEPENDENCE, UNSPECIFIED, UNCOMPLICATED: ICD-10-CM

## 2023-10-06 DIAGNOSIS — H91.93 UNSPECIFIED HEARING LOSS, BILATERAL: ICD-10-CM

## 2023-10-06 DIAGNOSIS — H93.13 TINNITUS, BILATERAL: ICD-10-CM

## 2023-10-06 DIAGNOSIS — M32.9 SYSTEMIC LUPUS ERYTHEMATOSUS, UNSPECIFIED: ICD-10-CM

## 2023-10-06 DIAGNOSIS — H92.03 OTALGIA, BILATERAL: ICD-10-CM

## 2023-10-06 DIAGNOSIS — Z78.9 OTHER SPECIFIED HEALTH STATUS: ICD-10-CM

## 2023-10-06 DIAGNOSIS — S09.93XA UNSPECIFIED INJURY OF FACE, INITIAL ENCOUNTER: ICD-10-CM

## 2023-10-06 PROCEDURE — 92557 COMPREHENSIVE HEARING TEST: CPT

## 2023-10-06 PROCEDURE — 92567 TYMPANOMETRY: CPT

## 2023-10-06 PROCEDURE — 31575 DIAGNOSTIC LARYNGOSCOPY: CPT

## 2023-10-06 PROCEDURE — 99215 OFFICE O/P EST HI 40 MIN: CPT | Mod: 25

## 2023-10-09 ENCOUNTER — NON-APPOINTMENT (OUTPATIENT)
Age: 38
End: 2023-10-09

## 2023-10-10 ENCOUNTER — NON-APPOINTMENT (OUTPATIENT)
Age: 38
End: 2023-10-10

## 2023-11-11 ENCOUNTER — EMERGENCY (EMERGENCY)
Facility: HOSPITAL | Age: 38
LOS: 1 days | Discharge: HOME | End: 2023-11-11
Attending: STUDENT IN AN ORGANIZED HEALTH CARE EDUCATION/TRAINING PROGRAM
Payer: MEDICAID

## 2023-11-11 VITALS
RESPIRATION RATE: 20 BRPM | OXYGEN SATURATION: 98 % | DIASTOLIC BLOOD PRESSURE: 98 MMHG | HEART RATE: 128 BPM | SYSTOLIC BLOOD PRESSURE: 129 MMHG

## 2023-11-11 DIAGNOSIS — F31.9 BIPOLAR DISORDER, UNSPECIFIED: ICD-10-CM

## 2023-11-11 LAB
ALBUMIN SERPL ELPH-MCNC: 4.7 G/DL — SIGNIFICANT CHANGE UP (ref 3.3–5.2)
ALBUMIN SERPL ELPH-MCNC: 4.7 G/DL — SIGNIFICANT CHANGE UP (ref 3.3–5.2)
ALP SERPL-CCNC: 62 U/L — SIGNIFICANT CHANGE UP (ref 40–120)
ALP SERPL-CCNC: 62 U/L — SIGNIFICANT CHANGE UP (ref 40–120)
ALT FLD-CCNC: 24 U/L — SIGNIFICANT CHANGE UP
ALT FLD-CCNC: 24 U/L — SIGNIFICANT CHANGE UP
AMPHET UR-MCNC: NEGATIVE — SIGNIFICANT CHANGE UP
AMPHET UR-MCNC: NEGATIVE — SIGNIFICANT CHANGE UP
ANION GAP SERPL CALC-SCNC: 14 MMOL/L — SIGNIFICANT CHANGE UP (ref 5–17)
ANION GAP SERPL CALC-SCNC: 14 MMOL/L — SIGNIFICANT CHANGE UP (ref 5–17)
APAP SERPL-MCNC: <3 UG/ML — LOW (ref 10–26)
APAP SERPL-MCNC: <3 UG/ML — LOW (ref 10–26)
APPEARANCE UR: ABNORMAL
APPEARANCE UR: ABNORMAL
AST SERPL-CCNC: 35 U/L — HIGH
AST SERPL-CCNC: 35 U/L — HIGH
BACTERIA # UR AUTO: NEGATIVE /HPF — SIGNIFICANT CHANGE UP
BACTERIA # UR AUTO: NEGATIVE /HPF — SIGNIFICANT CHANGE UP
BARBITURATES UR SCN-MCNC: NEGATIVE — SIGNIFICANT CHANGE UP
BARBITURATES UR SCN-MCNC: NEGATIVE — SIGNIFICANT CHANGE UP
BASOPHILS # BLD AUTO: 0.06 K/UL — SIGNIFICANT CHANGE UP (ref 0–0.2)
BASOPHILS # BLD AUTO: 0.06 K/UL — SIGNIFICANT CHANGE UP (ref 0–0.2)
BASOPHILS NFR BLD AUTO: 0.8 % — SIGNIFICANT CHANGE UP (ref 0–2)
BASOPHILS NFR BLD AUTO: 0.8 % — SIGNIFICANT CHANGE UP (ref 0–2)
BENZODIAZ UR-MCNC: POSITIVE
BENZODIAZ UR-MCNC: POSITIVE
BILIRUB SERPL-MCNC: 0.3 MG/DL — LOW (ref 0.4–2)
BILIRUB SERPL-MCNC: 0.3 MG/DL — LOW (ref 0.4–2)
BILIRUB UR-MCNC: NEGATIVE — SIGNIFICANT CHANGE UP
BILIRUB UR-MCNC: NEGATIVE — SIGNIFICANT CHANGE UP
BUN SERPL-MCNC: 14.7 MG/DL — SIGNIFICANT CHANGE UP (ref 8–20)
BUN SERPL-MCNC: 14.7 MG/DL — SIGNIFICANT CHANGE UP (ref 8–20)
CALCIUM SERPL-MCNC: 9.3 MG/DL — SIGNIFICANT CHANGE UP (ref 8.4–10.5)
CALCIUM SERPL-MCNC: 9.3 MG/DL — SIGNIFICANT CHANGE UP (ref 8.4–10.5)
CAST: 0 /LPF — SIGNIFICANT CHANGE UP (ref 0–4)
CAST: 0 /LPF — SIGNIFICANT CHANGE UP (ref 0–4)
CHLORIDE SERPL-SCNC: 105 MMOL/L — SIGNIFICANT CHANGE UP (ref 96–108)
CHLORIDE SERPL-SCNC: 105 MMOL/L — SIGNIFICANT CHANGE UP (ref 96–108)
CO2 SERPL-SCNC: 21 MMOL/L — LOW (ref 22–29)
CO2 SERPL-SCNC: 21 MMOL/L — LOW (ref 22–29)
COCAINE METAB.OTHER UR-MCNC: NEGATIVE — SIGNIFICANT CHANGE UP
COCAINE METAB.OTHER UR-MCNC: NEGATIVE — SIGNIFICANT CHANGE UP
COLOR SPEC: YELLOW — SIGNIFICANT CHANGE UP
COLOR SPEC: YELLOW — SIGNIFICANT CHANGE UP
CREAT SERPL-MCNC: 0.74 MG/DL — SIGNIFICANT CHANGE UP (ref 0.5–1.3)
CREAT SERPL-MCNC: 0.74 MG/DL — SIGNIFICANT CHANGE UP (ref 0.5–1.3)
DIFF PNL FLD: NEGATIVE — SIGNIFICANT CHANGE UP
DIFF PNL FLD: NEGATIVE — SIGNIFICANT CHANGE UP
EGFR: 105 ML/MIN/1.73M2 — SIGNIFICANT CHANGE UP
EGFR: 105 ML/MIN/1.73M2 — SIGNIFICANT CHANGE UP
EOSINOPHIL # BLD AUTO: 0.03 K/UL — SIGNIFICANT CHANGE UP (ref 0–0.5)
EOSINOPHIL # BLD AUTO: 0.03 K/UL — SIGNIFICANT CHANGE UP (ref 0–0.5)
EOSINOPHIL NFR BLD AUTO: 0.4 % — SIGNIFICANT CHANGE UP (ref 0–6)
EOSINOPHIL NFR BLD AUTO: 0.4 % — SIGNIFICANT CHANGE UP (ref 0–6)
ETHANOL SERPL-MCNC: <10 MG/DL — SIGNIFICANT CHANGE UP (ref 0–9)
ETHANOL SERPL-MCNC: <10 MG/DL — SIGNIFICANT CHANGE UP (ref 0–9)
GLUCOSE SERPL-MCNC: 128 MG/DL — HIGH (ref 70–99)
GLUCOSE SERPL-MCNC: 128 MG/DL — HIGH (ref 70–99)
GLUCOSE UR QL: NEGATIVE MG/DL — SIGNIFICANT CHANGE UP
GLUCOSE UR QL: NEGATIVE MG/DL — SIGNIFICANT CHANGE UP
HCG SERPL-ACNC: <4 MIU/ML — SIGNIFICANT CHANGE UP
HCG SERPL-ACNC: <4 MIU/ML — SIGNIFICANT CHANGE UP
HCT VFR BLD CALC: 42.5 % — SIGNIFICANT CHANGE UP (ref 34.5–45)
HCT VFR BLD CALC: 42.5 % — SIGNIFICANT CHANGE UP (ref 34.5–45)
HGB BLD-MCNC: 15.2 G/DL — SIGNIFICANT CHANGE UP (ref 11.5–15.5)
HGB BLD-MCNC: 15.2 G/DL — SIGNIFICANT CHANGE UP (ref 11.5–15.5)
IMM GRANULOCYTES NFR BLD AUTO: 0.1 % — SIGNIFICANT CHANGE UP (ref 0–0.9)
IMM GRANULOCYTES NFR BLD AUTO: 0.1 % — SIGNIFICANT CHANGE UP (ref 0–0.9)
KETONES UR-MCNC: 15 MG/DL
KETONES UR-MCNC: 15 MG/DL
LEUKOCYTE ESTERASE UR-ACNC: NEGATIVE — SIGNIFICANT CHANGE UP
LEUKOCYTE ESTERASE UR-ACNC: NEGATIVE — SIGNIFICANT CHANGE UP
LYMPHOCYTES # BLD AUTO: 2.08 K/UL — SIGNIFICANT CHANGE UP (ref 1–3.3)
LYMPHOCYTES # BLD AUTO: 2.08 K/UL — SIGNIFICANT CHANGE UP (ref 1–3.3)
LYMPHOCYTES # BLD AUTO: 27.3 % — SIGNIFICANT CHANGE UP (ref 13–44)
LYMPHOCYTES # BLD AUTO: 27.3 % — SIGNIFICANT CHANGE UP (ref 13–44)
MCHC RBC-ENTMCNC: 31.4 PG — SIGNIFICANT CHANGE UP (ref 27–34)
MCHC RBC-ENTMCNC: 31.4 PG — SIGNIFICANT CHANGE UP (ref 27–34)
MCHC RBC-ENTMCNC: 35.8 GM/DL — SIGNIFICANT CHANGE UP (ref 32–36)
MCHC RBC-ENTMCNC: 35.8 GM/DL — SIGNIFICANT CHANGE UP (ref 32–36)
MCV RBC AUTO: 87.8 FL — SIGNIFICANT CHANGE UP (ref 80–100)
MCV RBC AUTO: 87.8 FL — SIGNIFICANT CHANGE UP (ref 80–100)
METHADONE UR-MCNC: NEGATIVE — SIGNIFICANT CHANGE UP
METHADONE UR-MCNC: NEGATIVE — SIGNIFICANT CHANGE UP
MONOCYTES # BLD AUTO: 0.35 K/UL — SIGNIFICANT CHANGE UP (ref 0–0.9)
MONOCYTES # BLD AUTO: 0.35 K/UL — SIGNIFICANT CHANGE UP (ref 0–0.9)
MONOCYTES NFR BLD AUTO: 4.6 % — SIGNIFICANT CHANGE UP (ref 2–14)
MONOCYTES NFR BLD AUTO: 4.6 % — SIGNIFICANT CHANGE UP (ref 2–14)
NEUTROPHILS # BLD AUTO: 5.1 K/UL — SIGNIFICANT CHANGE UP (ref 1.8–7.4)
NEUTROPHILS # BLD AUTO: 5.1 K/UL — SIGNIFICANT CHANGE UP (ref 1.8–7.4)
NEUTROPHILS NFR BLD AUTO: 66.8 % — SIGNIFICANT CHANGE UP (ref 43–77)
NEUTROPHILS NFR BLD AUTO: 66.8 % — SIGNIFICANT CHANGE UP (ref 43–77)
NITRITE UR-MCNC: NEGATIVE — SIGNIFICANT CHANGE UP
NITRITE UR-MCNC: NEGATIVE — SIGNIFICANT CHANGE UP
OPIATES UR-MCNC: NEGATIVE — SIGNIFICANT CHANGE UP
OPIATES UR-MCNC: NEGATIVE — SIGNIFICANT CHANGE UP
PCP SPEC-MCNC: SIGNIFICANT CHANGE UP
PCP SPEC-MCNC: SIGNIFICANT CHANGE UP
PCP UR-MCNC: NEGATIVE — SIGNIFICANT CHANGE UP
PCP UR-MCNC: NEGATIVE — SIGNIFICANT CHANGE UP
PH UR: 6.5 — SIGNIFICANT CHANGE UP (ref 5–8)
PH UR: 6.5 — SIGNIFICANT CHANGE UP (ref 5–8)
PLATELET # BLD AUTO: 123 K/UL — LOW (ref 150–400)
PLATELET # BLD AUTO: 123 K/UL — LOW (ref 150–400)
POTASSIUM SERPL-MCNC: 3.7 MMOL/L — SIGNIFICANT CHANGE UP (ref 3.5–5.3)
POTASSIUM SERPL-MCNC: 3.7 MMOL/L — SIGNIFICANT CHANGE UP (ref 3.5–5.3)
POTASSIUM SERPL-SCNC: 3.7 MMOL/L — SIGNIFICANT CHANGE UP (ref 3.5–5.3)
POTASSIUM SERPL-SCNC: 3.7 MMOL/L — SIGNIFICANT CHANGE UP (ref 3.5–5.3)
PROT SERPL-MCNC: 7 G/DL — SIGNIFICANT CHANGE UP (ref 6.6–8.7)
PROT SERPL-MCNC: 7 G/DL — SIGNIFICANT CHANGE UP (ref 6.6–8.7)
PROT UR-MCNC: 30 MG/DL
PROT UR-MCNC: 30 MG/DL
RBC # BLD: 4.84 M/UL — SIGNIFICANT CHANGE UP (ref 3.8–5.2)
RBC # BLD: 4.84 M/UL — SIGNIFICANT CHANGE UP (ref 3.8–5.2)
RBC # FLD: 12.3 % — SIGNIFICANT CHANGE UP (ref 10.3–14.5)
RBC # FLD: 12.3 % — SIGNIFICANT CHANGE UP (ref 10.3–14.5)
RBC CASTS # UR COMP ASSIST: 2 /HPF — SIGNIFICANT CHANGE UP (ref 0–4)
RBC CASTS # UR COMP ASSIST: 2 /HPF — SIGNIFICANT CHANGE UP (ref 0–4)
SALICYLATES SERPL-MCNC: <0.6 MG/DL — LOW (ref 10–20)
SALICYLATES SERPL-MCNC: <0.6 MG/DL — LOW (ref 10–20)
SARS-COV-2 RNA SPEC QL NAA+PROBE: SIGNIFICANT CHANGE UP
SARS-COV-2 RNA SPEC QL NAA+PROBE: SIGNIFICANT CHANGE UP
SODIUM SERPL-SCNC: 140 MMOL/L — SIGNIFICANT CHANGE UP (ref 135–145)
SODIUM SERPL-SCNC: 140 MMOL/L — SIGNIFICANT CHANGE UP (ref 135–145)
SP GR SPEC: 1.02 — SIGNIFICANT CHANGE UP (ref 1–1.03)
SP GR SPEC: 1.02 — SIGNIFICANT CHANGE UP (ref 1–1.03)
SQUAMOUS # UR AUTO: 1 /HPF — SIGNIFICANT CHANGE UP (ref 0–5)
SQUAMOUS # UR AUTO: 1 /HPF — SIGNIFICANT CHANGE UP (ref 0–5)
THC UR QL: POSITIVE
THC UR QL: POSITIVE
UROBILINOGEN FLD QL: 1 MG/DL — SIGNIFICANT CHANGE UP (ref 0.2–1)
UROBILINOGEN FLD QL: 1 MG/DL — SIGNIFICANT CHANGE UP (ref 0.2–1)
WBC # BLD: 7.63 K/UL — SIGNIFICANT CHANGE UP (ref 3.8–10.5)
WBC # BLD: 7.63 K/UL — SIGNIFICANT CHANGE UP (ref 3.8–10.5)
WBC # FLD AUTO: 7.63 K/UL — SIGNIFICANT CHANGE UP (ref 3.8–10.5)
WBC # FLD AUTO: 7.63 K/UL — SIGNIFICANT CHANGE UP (ref 3.8–10.5)
WBC UR QL: 1 /HPF — SIGNIFICANT CHANGE UP (ref 0–5)
WBC UR QL: 1 /HPF — SIGNIFICANT CHANGE UP (ref 0–5)

## 2023-11-11 PROCEDURE — 90792 PSYCH DIAG EVAL W/MED SRVCS: CPT

## 2023-11-11 PROCEDURE — 99291 CRITICAL CARE FIRST HOUR: CPT

## 2023-11-11 RX ORDER — DIPHENHYDRAMINE HCL 50 MG
50 CAPSULE ORAL ONCE
Refills: 0 | Status: COMPLETED | OUTPATIENT
Start: 2023-11-11 | End: 2023-11-11

## 2023-11-11 RX ORDER — MIDAZOLAM HYDROCHLORIDE 1 MG/ML
5 INJECTION, SOLUTION INTRAMUSCULAR; INTRAVENOUS ONCE
Refills: 0 | Status: DISCONTINUED | OUTPATIENT
Start: 2023-11-11 | End: 2023-11-11

## 2023-11-11 RX ORDER — HALOPERIDOL DECANOATE 100 MG/ML
5 INJECTION INTRAMUSCULAR ONCE
Refills: 0 | Status: COMPLETED | OUTPATIENT
Start: 2023-11-11 | End: 2023-11-11

## 2023-11-11 RX ORDER — KETAMINE HYDROCHLORIDE 100 MG/ML
150 INJECTION INTRAMUSCULAR; INTRAVENOUS ONCE
Refills: 0 | Status: DISCONTINUED | OUTPATIENT
Start: 2023-11-11 | End: 2023-11-11

## 2023-11-11 RX ADMIN — Medication 2 MILLIGRAM(S): at 13:15

## 2023-11-11 RX ADMIN — HALOPERIDOL DECANOATE 5 MILLIGRAM(S): 100 INJECTION INTRAMUSCULAR at 12:35

## 2023-11-11 RX ADMIN — KETAMINE HYDROCHLORIDE 150 MILLIGRAM(S): 100 INJECTION INTRAMUSCULAR; INTRAVENOUS at 12:45

## 2023-11-11 RX ADMIN — Medication 1 MILLIGRAM(S): at 13:05

## 2023-11-11 RX ADMIN — MIDAZOLAM HYDROCHLORIDE 5 MILLIGRAM(S): 1 INJECTION, SOLUTION INTRAMUSCULAR; INTRAVENOUS at 12:35

## 2023-11-11 RX ADMIN — Medication 4 MILLIGRAM(S): at 14:27

## 2023-11-11 RX ADMIN — Medication 50 MILLIGRAM(S): at 14:26

## 2023-11-11 NOTE — ED BEHAVIORAL HEALTH ASSESSMENT NOTE - RISK ASSESSMENT
RF: presenting agitation, not connected to treatment, substance abuse, unemployed  PF: Supportive family, responsibility to daughter, domiciled
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
## 2023-11-11 NOTE — ED BEHAVIORAL HEALTH ASSESSMENT NOTE - DESCRIPTION
Fibromyalgia, Lupus, Lyme Pt acutely agitated requiring multiple stat meds including Haldol, Ativan, Versed, Benadryl and Ketamine.     ICU Vital Signs Last 24 Hrs  T(C): 36.9 (11 Nov 2023 17:41), Max: 36.9 (11 Nov 2023 17:41)  T(F): 98.5 (11 Nov 2023 17:41), Max: 98.5 (11 Nov 2023 17:41)  HR: 97 (11 Nov 2023 17:41) (97 - 128)  BP: 104/70 (11 Nov 2023 17:41) (104/70 - 140/97)  BP(mean): 82 (11 Nov 2023 17:41) (82 - 82)  ABP: --  ABP(mean): --  RR: 18 (11 Nov 2023 17:41) (18 - 20)  SpO2: 98% (11 Nov 2023 17:41) (96% - 100%)    O2 Parameters below as of 11 Nov 2023 17:41  Patient On (Oxygen Delivery Method): room air Domiciled with daughter in [place]; moving in with parents next month in [place]

## 2023-11-11 NOTE — ED BEHAVIORAL HEALTH ASSESSMENT NOTE - VIOLENCE RISK FACTORS:
Feeling of being under threat and being unable to control threat/Violent ideation/threat/speech/Affective dysregulation/Impulsivity

## 2023-11-11 NOTE — ED ADULT NURSE NOTE - ED STAT RN HANDOFF DETAILS 3
[facility]      OUTPATIENT PHYSICAL THERAPY EVALUATION  PLAN OF TREATMENT FOR OUTPATIENT REHABILITATION  (COMPLETE FOR INITIAL CLAIMS ONLY)  Patient's Last Name, First Name, M.I.  YOB: 1941  Tomasa Fam                        Provider's Name  [facility] Medical Record No.  [id number]                               Onset Date:  08/31/20   Start of Care Date:  09/01/20      Type:     _X_PT   ___OT   ___SLP Medical Diagnosis:  UTI, falls                        PT Diagnosis:  Decreased functional mobility   Visits from SOC:  1   _________________________________________________________________________________  Plan of Treatment/Functional Goals    Planned Interventions:  ,    balance training, bed mobility training, gait training, strengthening, transfer training, risk factor education, home program guidelines, progressive activity/exercise,       Goals: See Physical Therapy Goals on Care Plan in [facility] electronic health record.    Therapy Frequency: 5x/week  Predicted Duration of Therapy Intervention: 2 days  _________________________________________________________________________________    I CERTIFY THE NEED FOR THESE SERVICES FURNISHED UNDER        THIS PLAN OF TREATMENT AND WHILE UNDER MY CARE     (Physician co-signature of this document indicates review and certification of the therapy plan).                Certification date from: 09/01/20, Certification date to: 09/03/20    Referring Physician: Joanie Augustin PA-C            Initial Assessment        See Physical Therapy evaluation dated 09/01/20 in Epic electronic health record.   REPORT GIVEN TO [facility]

## 2023-11-11 NOTE — ED ADULT NURSE REASSESSMENT NOTE - NS ED NURSE REASSESS COMMENT FT1
Patient presented in  area dressed in yellow gowns.  Patient is awake and alert.  Patient oriented to  staff and educated about plan of care psychiatric consult.  Patient ambulating independently with orthotic shoe.  Patient reports history of broken foot from a past fall.  Patient denies suicidal or homicidal ideations.  Patient reports she was upset coming into the hospital because she was concerned about her phone being hacked and wanted to protect her daughter.  No attempts to harm self or others.  Patient provided urine for testing.  Patient was cooperative with security contraband assessment.  No personal belongings on presentation to  area.  Patient accepted dinner tray at bedside and is awaiting psychiatric consult.  Safety of patient maintained.

## 2023-11-11 NOTE — ED BEHAVIORAL HEALTH ASSESSMENT NOTE - NSRISKVIOL_PSY_A_CORE
[FreeTextEntry1] : cough improving, using only flonase at this point.\par no dyspnea/chest pain\par ct was negative for PE\par \par had home sleep study\par moderate humberto\par ahi 25 Moderate
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
## 2023-11-11 NOTE — ED ADULT NURSE REASSESSMENT NOTE - NSFALLRISKINTERV_ED_ALL_ED
Assistance OOB with selected safe patient handling equipment if applicable/Assistance with ambulation/Communicate fall risk and risk factors to all staff, patient, and family/Monitor gait and stability/Monitor for mental status changes and reorient to person, place, and time, as needed/Provide visual cue: yellow wristband, yellow gown, etc/Reinforce activity limits and safety measures with patient and family/Toileting schedule using arm’s reach rule for commode and bathroom/Use of alarms - bed, stretcher, chair and/or video monitoring/Call bell, personal items and telephone in reach/Instruct patient to call for assistance before getting out of bed/chair/stretcher/Non-slip footwear applied when patient is off stretcher/[place] to call system/Physically safe environment - no spills, clutter or unnecessary equipment/Purposeful Proactive Rounding/Room/bathroom lighting operational, light cord in reach

## 2023-11-11 NOTE — ED PROVIDER NOTE - NSCAREINITIATED _GEN_ER
Skye Calderon(Attending) Estlander Flap (Upper To Lower Lip) Text: The defect of the lower lip was assessed and measured.  Given the location and size of the defect, an Estlander flap was deemed most appropriate.  Using a sterile surgical marker, an appropriate Estlander flap was measured and drawn on the upper lip. Local anesthesia was then infiltrated. A scalpel was then used to incise the lateral aspect of the flap, through skin, muscle and mucosa, leaving the flap pedicled medially.  The flap was then rotated and positioned to fill the lower lip defect.  The flap was then sutured into place with a three layer technique, closing the orbicularis oris muscle layer with subcutaneous buried sutures, followed by a mucosal layer and an epidermal layer.

## 2023-11-11 NOTE — ED PROVIDER NOTE - CLINICAL SUMMARY MEDICAL DECISION MAKING FREE TEXT BOX
Patient with bizarre behavior and severely agitated and combative, attempted verbal deescalation, anxiolytic, however patient continued to fight and scream at staff, danger to self and others requiring sedation with ketamine. Plan to obtain psych clearance labs, have psychiatry evaluate patient.      Patient's brother-in-law, Ayesha: [phone] Patient with bizarre behavior and severely agitated and combative, attempted verbal deescalation, anxiolytic, however patient continued to fight and scream at staff, danger to self and others requiring sedation with ketamine. Plan to obtain psych clearance labs, have psychiatry evaluate patient.      Patient's brother-in-law, Ayesha: [phone]    Patient seen by psychiatry, plan to hold for reassessment.

## 2023-11-11 NOTE — ED ADULT NURSE NOTE - OBJECTIVE STATEMENT
BIBEMS for agitation and bizarre behavior. Pt with PMHx bipolar disorder, SLE and crest syndrome is concerned someone is hacking her phone. Pt is noted to be yelling and argumentative, stating over and over again "I need to go to the [employer] store."

## 2023-11-11 NOTE — ED ADULT NURSE NOTE - NSICDXPASTMEDICALHX_GEN_ALL_CORE_FT
PAST MEDICAL HISTORY:  CREST (calcinosis, Raynaud's phenomenon, esophageal dysfunction, sclerodactyly, telangiectasia)     Lupus     Premature ovarian failure     Raynaud's disease

## 2023-11-11 NOTE — ED ADULT NURSE NOTE - NSFALLRISKINTERV_ED_ALL_ED
Assistance OOB with selected safe patient handling equipment if applicable/Communicate fall risk and risk factors to all staff, patient, and family/Encourage patient to sit up slowly, dangle for a short time, stand at bedside before walking/Orthostatic vital signs/Provide visual cue: yellow wristband, yellow gown, etc/Reinforce activity limits and safety measures with patient and family/Review medications for side effects contributing to fall risk/Toileting schedule using arm’s reach rule for commode and bathroom/Use of alarms - bed, stretcher, chair and/or video monitoring/Call bell, personal items and telephone in reach/Instruct patient to call for assistance before getting out of bed/chair/stretcher/Non-slip footwear applied when patient is off stretcher/[place] to call system/Physically safe environment - no spills, clutter or unnecessary equipment/Purposeful Proactive Rounding/Room/bathroom lighting operational, light cord in reach

## 2023-11-11 NOTE — ED PROVIDER NOTE - ATTENDING CONTRIBUTION TO CARE
I independently spent above time on critical care.     HPI/ROS/PE/MDM BY ME.       I performed a history and physical exam of the patient and discussed their management with the resident. I reviewed the resident's note and agree with the documented findings and plan of care. My medical decision making and observations are found above.

## 2023-11-11 NOTE — ED ADULT NURSE REASSESSMENT NOTE - NS ED NURSE REASSESS COMMENT FT1
pt having intermittent periods of restlessness in stretcher and pulling at lines attempting to get out of stretcher, resp even and unlabored no distress noted, MD Archer and MD Calderon at bedside, pt having intermittent periods of restlessness in stretcher and pulling at lines attempting to get out of stretcher, resp even and unlabored no distress noted, MD Archer and MD Calderon at bedside and 1:1 placed at bedside
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
## 2023-11-11 NOTE — ED CDU PROVIDER INITIAL DAY NOTE - CLINICAL SUMMARY MEDICAL DECISION MAKING FREE TEXT BOX
38-year-old female with past medical history of bipolar disorder presenting with bizarre behavior.  Patient was medically optimized for psychiatric evaluation.  Seen by psychiatry, plan to hold for reassessment.

## 2023-11-11 NOTE — ED ADULT NURSE REASSESSMENT NOTE - NS ED NURSE REASSESS COMMENT FT1
Plan of care assumed as covering nurse for VICTOR MANUEL Stapleton's break at 15:14. Author VICTOR MANUEL, Gem Ramsey introduced self to pt. Pt does not offer acute complaints, awaiting psych consult.

## 2023-11-11 NOTE — ED ADULT NURSE REASSESSMENT NOTE - NS ED NURSE REASSESS COMMENT FT1
Pt endorsed from Critical Care, Pt awake and rambling about cell phone, apple store, and phone being hacked.

## 2023-11-11 NOTE — ED PROVIDER NOTE - PHYSICAL EXAMINATION
Gen: agitated, screaming, yelling  Head: NCAT  HEENT: oral mucosa moist, normal conjunctiva, oropharynx clear without exudate or erythema  Lung: CTAB, no respiratory distress, no wheezing, rales, rhonchi  CV: normal s1/s2, rrr, no murmurs, Normal perfusion, pulses 2+ throughout  Abd: soft, NTND  MSK: No edema, no visible deformities, full range of motion in all 4 extremities  Neuro: CN II-XII grossly intact, No focal neurologic deficits  Skin: No rash   Psych: elevated speech, paranoid

## 2023-11-11 NOTE — ED ADULT NURSE REASSESSMENT NOTE - NS ED NURSE REASSESS COMMENT FT1
Assumed patient care at 7:00 PM. Patient in bed, patient asleep, patient did not answer to questions and refused to talk. No agitation noted, patient is not a threat to herself and others, will continue to provide therapeutic and safe environment
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
## 2023-11-11 NOTE — ED BEHAVIORAL HEALTH ASSESSMENT NOTE - SUMMARY
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     Upon assessment pt presents anxious, irritable with pressured speech, paranoid delusions with minimal insight. Collateral collected from family have expressed acute safety concerns due to pt's recent decompensation. UDS positive for benzos and THC. Pt will be held for substance metabolism and re-evaluated in the morning for possible admission.

## 2023-11-12 PROCEDURE — 99232 SBSQ HOSP IP/OBS MODERATE 35: CPT

## 2023-11-12 PROCEDURE — 99282 EMERGENCY DEPT VISIT SF MDM: CPT

## 2023-11-12 RX ORDER — ALPRAZOLAM 0.25 MG
1 TABLET ORAL THREE TIMES A DAY
Refills: 0 | Status: DISCONTINUED | OUTPATIENT
Start: 2023-11-12 | End: 2023-11-12

## 2023-11-12 RX ORDER — IBUPROFEN 200 MG
600 TABLET ORAL EVERY 6 HOURS
Refills: 0 | Status: DISCONTINUED | OUTPATIENT
Start: 2023-11-12 | End: 2023-11-19

## 2023-11-12 RX ORDER — NICOTINE POLACRILEX 2 MG
4 GUM BUCCAL
Refills: 0 | Status: DISCONTINUED | OUTPATIENT
Start: 2023-11-12 | End: 2023-11-19

## 2023-11-12 RX ORDER — RISPERIDONE 4 MG/1
1 TABLET ORAL ONCE
Refills: 0 | Status: COMPLETED | OUTPATIENT
Start: 2023-11-12 | End: 2023-11-12

## 2023-11-12 RX ORDER — BUPRENORPHINE AND NALOXONE 2; .5 MG/1; MG/1
1 TABLET SUBLINGUAL
Refills: 0 | Status: COMPLETED | OUTPATIENT
Start: 2023-11-12 | End: 2023-11-19

## 2023-11-12 RX ORDER — CLONAZEPAM 1 MG
1 TABLET ORAL EVERY 8 HOURS
Refills: 0 | Status: COMPLETED | OUTPATIENT
Start: 2023-11-12 | End: 2023-11-19

## 2023-11-12 RX ADMIN — BUPRENORPHINE AND NALOXONE 1 TABLET(S): 2; .5 TABLET SUBLINGUAL at 23:16

## 2023-11-12 RX ADMIN — Medication 1 MILLIGRAM(S): at 14:42

## 2023-11-12 RX ADMIN — BUPRENORPHINE AND NALOXONE 1 TABLET(S): 2; .5 TABLET SUBLINGUAL at 14:42

## 2023-11-12 RX ADMIN — BUPRENORPHINE AND NALOXONE 1 TABLET(S): 2; .5 TABLET SUBLINGUAL at 07:46

## 2023-11-12 RX ADMIN — Medication 600 MILLIGRAM(S): at 18:16

## 2023-11-12 RX ADMIN — Medication 1 MILLIGRAM(S): at 22:03

## 2023-11-12 RX ADMIN — Medication 1 MILLIGRAM(S): at 07:46

## 2023-11-12 RX ADMIN — RISPERIDONE 1 MILLIGRAM(S): 4 TABLET ORAL at 10:45

## 2023-11-12 NOTE — ED CDU PROVIDER SUBSEQUENT DAY NOTE - PROGRESS NOTE DETAILS
Called by RN- patient takes suboxone TID, xanax TID. ISTOP Reference #: [id number] reviewed- patient prescribed suboxone 8mg-2mg TID, xanax 1mg TID, klonopin 1mg TID. Orders placed for suboxone and klonopin. Pending  reeval this morning.
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
## 2023-11-12 NOTE — ED ADULT NURSE REASSESSMENT NOTE - NS ED NURSE REASSESS COMMENT FT1
Assumed care of patient at 07:10.  Patient awake and anxious.  Patient verbalizing being upset her medication has not been ordered and that she remains in BH area.  Patient educated about plan of care.  Dr. Calderon notified of patients request for medications.  No attempts to harm self or others.  Safety of patient maintained.

## 2023-11-12 NOTE — ED ADULT NURSE REASSESSMENT NOTE - NS ED NURSE REASSESS COMMENT FT1
Patient ate 50% of her meals. Patient tearful at times about continued hospitalization.  Patient denies suicidal or homicidal ideations.  Patient continues to endorse this all happened due to malfunctioning phone.  Patient compliant with medications.  No attempts to harm self or others and safety maintained.

## 2023-11-12 NOTE — ED ADULT NURSE REASSESSMENT NOTE - NS ED NURSE REASSESS COMMENT FT1
Patient requested and received Motrin 600mg PO for chronic back pain with results pending.  Patient ate 100% of her dinner.  Patient continues to deny need for psychiatric treatment on inpatient unit but verbalizing understanding of plan.  No attempts to harm self or others.  Safety of patient maintained.

## 2023-11-13 VITALS
TEMPERATURE: 98 F | DIASTOLIC BLOOD PRESSURE: 75 MMHG | HEART RATE: 88 BPM | RESPIRATION RATE: 18 BRPM | OXYGEN SATURATION: 98 % | SYSTOLIC BLOOD PRESSURE: 125 MMHG

## 2023-11-13 PROCEDURE — 96374 THER/PROPH/DIAG INJ IV PUSH: CPT

## 2023-11-13 PROCEDURE — 99238 HOSP IP/OBS DSCHRG MGMT 30/<: CPT

## 2023-11-13 PROCEDURE — 96376 TX/PRO/DX INJ SAME DRUG ADON: CPT

## 2023-11-13 PROCEDURE — 80053 COMPREHEN METABOLIC PANEL: CPT

## 2023-11-13 PROCEDURE — 87635 SARS-COV-2 COVID-19 AMP PRB: CPT

## 2023-11-13 PROCEDURE — 36415 COLL VENOUS BLD VENIPUNCTURE: CPT

## 2023-11-13 PROCEDURE — 81001 URINALYSIS AUTO W/SCOPE: CPT

## 2023-11-13 PROCEDURE — 85025 COMPLETE CBC W/AUTO DIFF WBC: CPT

## 2023-11-13 PROCEDURE — 84702 CHORIONIC GONADOTROPIN TEST: CPT

## 2023-11-13 PROCEDURE — G0378: CPT

## 2023-11-13 PROCEDURE — 99285 EMERGENCY DEPT VISIT HI MDM: CPT | Mod: 25

## 2023-11-13 PROCEDURE — 96375 TX/PRO/DX INJ NEW DRUG ADDON: CPT

## 2023-11-13 PROCEDURE — 73600 X-RAY EXAM OF ANKLE: CPT | Mod: 26,LT

## 2023-11-13 PROCEDURE — 80307 DRUG TEST PRSMV CHEM ANLYZR: CPT

## 2023-11-13 PROCEDURE — 96372 THER/PROPH/DIAG INJ SC/IM: CPT | Mod: XU

## 2023-11-13 PROCEDURE — 99213 OFFICE O/P EST LOW 20 MIN: CPT

## 2023-11-13 PROCEDURE — 73600 X-RAY EXAM OF ANKLE: CPT

## 2023-11-13 RX ADMIN — BUPRENORPHINE AND NALOXONE 1 TABLET(S): 2; .5 TABLET SUBLINGUAL at 14:22

## 2023-11-13 RX ADMIN — Medication 1 MILLIGRAM(S): at 14:22

## 2023-11-13 RX ADMIN — BUPRENORPHINE AND NALOXONE 1 TABLET(S): 2; .5 TABLET SUBLINGUAL at 06:41

## 2023-11-13 RX ADMIN — Medication 1 MILLIGRAM(S): at 06:40

## 2023-11-13 RX ADMIN — Medication 600 MILLIGRAM(S): at 14:22

## 2023-11-13 RX ADMIN — Medication 1 MILLIGRAM(S): at 19:55

## 2023-11-13 RX ADMIN — Medication 600 MILLIGRAM(S): at 15:30

## 2023-11-13 NOTE — ED ADULT NURSE REASSESSMENT NOTE - GENERAL PATIENT STATE
comfortable appearance/cooperative
comfortable appearance/cooperative/no change observed/resting/sleeping
cooperative/crying
[place]
anxious
comfortable appearance/resting/sleeping
comfortable appearance/resting/sleeping
irritable
anxious/cooperative
comfortable appearance/resting/sleeping
n/a

## 2023-11-13 NOTE — ED BEHAVIORAL HEALTH PROGRESS NOTE - CASE SUMMARY/FORMULATION (CLEARLY DOCUMENT RATIONALE FOR DISPOSITION CHANGE)
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; unemployed on SSI; past psychiatric history of bipolar disorder and opiate abuse; no psychiatric hospitalizations; no known suicide attempts; no known history of violence or arrests; currently on Suboxone for opiate abuse, current use of THC and benzodiazepines; pt denies ever being in treatment for her bipolar disorder or ever taking any psychotropic medications before; no known history of complicated withdrawal; claims PM of fibromyalgia, SLE, Lyme disease; brought in by EMS; called by family while the pt was at her parents home today here in [place]; presenting with agitation; in the setting of acute paranoia.     Pt was initially seen on 11/11 where she tested positive for THC and held for re-eval; pt reassessed yesterday and determined to warrant inpatient treatment due to ongoing paranoia, recent disorganized, erratic, impulsive behavior coupled with an inability to care for self; pt reportedly not maintaining hygiene, not eating or sleeping. Pt re-evaluated this morning by writer and continues to present with poor insight and judgement, loud, pressured speech, delusions and paranoia. Pt continues to warrant a higher level of treatment and is being held for involuntary psychiatric transfer pending bed availability.    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; unemployed on SSI; past psychiatric history of bipolar disorder and opiate abuse; no psychiatric hospitalizations; no known suicide attempts; no known history of violence or arrests; currently on Suboxone for opiate abuse, current use of THC and benzodiazepines; pt denies ever being in treatment for her bipolar disorder or ever taking any psychotropic medications before; no known history of complicated withdrawal; claims [facility] of fibromyalgia, SLE, Lyme disease; brought in by EMS; called by family while the pt was at her parents home today here in [place]; presenting with agitation; in the setting of acute paranoia.     Pt was initially seen on 11/11 where she tested positive for THC and held for re-eval; pt reassessed yesterday and determined to warrant inpatient treatment due to ongoing paranoia, recent disorganized, erratic, impulsive behavior coupled with an inability to care for self; pt reportedly not maintaining hygiene, not eating or sleeping and self isolating due to paranoia. Pt re-evaluated this morning by writer and continues to present with poor insight and judgement, loud, pressured speech, delusions and paranoia. Pt continues to endorse her phone being bugged and claims that the [facility] advised her to call 911 due to all her phone numbers in her phone being "scrambled." Pt claims she limits her interactions with others because "they're good for her." Pt continues to warrant a higher level of treatment and is being held for involuntary psychiatric transfer pending bed availability.

## 2023-11-13 NOTE — ED BEHAVIORAL HEALTH PROGRESS NOTE - SUMMARY
Previous assessments:   Poor insight, erratic, impulsive behavior, not connected to treatment, history of substance abuse

## 2023-11-13 NOTE — ED BEHAVIORAL HEALTH PROGRESS NOTE - NSBHATTESTAPPBILLTIME_PSY_A_CORE
I attest my time as VANDANA is greater than 50% of the total combined time spent on qualifying patient care activities. I have reviewed and verified the documentation.

## 2023-11-13 NOTE — ED CDU PROVIDER SUBSEQUENT DAY NOTE - NSICDXPASTMEDICALHX_GEN_ALL_CORE_FT
PAST MEDICAL HISTORY:  CREST (calcinosis, Raynaud's phenomenon, esophageal dysfunction, sclerodactyly, telangiectasia)     Lupus     Premature ovarian failure     Raynaud's disease     
PAST MEDICAL HISTORY:  CREST (calcinosis, Raynaud's phenomenon, esophageal dysfunction, sclerodactyly, telangiectasia)     Lupus     Premature ovarian failure     Raynaud's disease

## 2023-11-13 NOTE — ED ADULT NURSE REASSESSMENT NOTE - STATUS
awaiting discharge, no change
awaiting transfer, no change
awaiting consult
awaiting transfer, no change
awaiting transfer, no change
reassessment

## 2023-11-13 NOTE — ED ADULT NURSE REASSESSMENT NOTE - NS ED NURSE REASSESS COMMENT FT1
Patient has been in room most of the day. Comes out to request telephone to call family/friends. Taken to get x-ray of L ankle/foot as ordered. Ate well (80%-100%) at breakfast and lunch without incident, tolerated PO fluids well. Aware of plan to transfer to inpatient facility when bed available.

## 2023-11-13 NOTE — ED ADULT NURSE REASSESSMENT NOTE - COMFORT CARE
plan of care explained
ambulated to bathroom/plan of care explained
meal provided/plan of care explained
ambulated to bathroom/darkened lights/meal provided/po fluids offered

## 2023-11-13 NOTE — ED ADULT NURSE REASSESSMENT NOTE - NS ED NURSE REASSESS COMMENT FT1
pt reports partial pain relief 2/10. Pt continues with paranoid thoughts. No attempts to harm self or others. pt was accepted by [facility] and reports was provided to Evi RUSH. pt educated on the plan of care and able to repeat back plan successfully.

## 2023-11-13 NOTE — ED BEHAVIORAL HEALTH PROGRESS NOTE - COLLATERAL INFORMATION (NAME, PHONE, RELATIONSHIP):
Pt re-assessed this morning and further collateral collected from brother who continues to advocate for admission.

## 2023-11-13 NOTE — ED ADULT NURSE REASSESSMENT NOTE - NS ED NURSE REASSESS COMMENT FT1
soh called rai amin made aware pt reived ativan 1 mg po at transport.  made aware pt requires 3rd doses of klonopin and subox.

## 2023-11-13 NOTE — CHART NOTE - NSCHARTNOTEFT_GEN_A_CORE
SW Note: Plan is to transfer pt for IP psychiatric care. Referral made to [facility], bed available. Pt is wearing an ortho boot stating her foot is fx. Pts brother has reported that his sister did fall and fx her foot in August but that it has healed and she has been told to stop wearing the boot. He states that she continues to wear the boot due to her psychosis.   Xray ordered and pending results.  RN aware [facility] is asking for a CIWA and note regarding any issues with eating or drinking.
MAGDINote: Pt accepted by Dr Shipley at [facility] (16:00) Joaquim running behind , pt p/u now . Transfer will need auth ([facility]) ,SW to follow asap.
Plan is T&R vs IP psych transfer, No Beds - [facility], , [facility], [facility], [facility], [facility], [facility], [facility], [facility], [facility]. Left messages 3x for - [facility] and [facility]. [facility] and [facility] - pending potential dcs, asked for paperwork to be sent, [facility] only returned my calls w/ updates at 11:46am after two voicemails. Referrals faxed for review to [facility] and [facility]. MAGDI will continue following for d/c planning.

## 2023-11-13 NOTE — ED CDU PROVIDER DISPOSITION NOTE - CLINICAL COURSE
38F with psychosis; held in psychiatric observation until inpatient bed available, now has bed availability and stable for transfer.

## 2023-11-14 ENCOUNTER — APPOINTMENT (OUTPATIENT)
Dept: OTOLARYNGOLOGY | Facility: CLINIC | Age: 38
End: 2023-11-14

## 2023-11-29 ENCOUNTER — APPOINTMENT (OUTPATIENT)
Dept: OTOLARYNGOLOGY | Facility: CLINIC | Age: 38
End: 2023-11-29

## 2024-06-17 ENCOUNTER — EMERGENCY (EMERGENCY)
Facility: HOSPITAL | Age: 39
LOS: 1 days | Discharge: HOME | End: 2024-06-17
Attending: EMERGENCY MEDICINE
Payer: MEDICAID

## 2024-06-17 VITALS
OXYGEN SATURATION: 97 % | HEART RATE: 74 BPM | DIASTOLIC BLOOD PRESSURE: 80 MMHG | SYSTOLIC BLOOD PRESSURE: 115 MMHG | RESPIRATION RATE: 16 BRPM | TEMPERATURE: 98 F

## 2024-06-17 VITALS
WEIGHT: 130.07 LBS | HEART RATE: 95 BPM | OXYGEN SATURATION: 96 % | RESPIRATION RATE: 18 BRPM | DIASTOLIC BLOOD PRESSURE: 71 MMHG | TEMPERATURE: 98 F | SYSTOLIC BLOOD PRESSURE: 106 MMHG

## 2024-06-17 PROCEDURE — 99283 EMERGENCY DEPT VISIT LOW MDM: CPT

## 2024-06-17 PROCEDURE — 99285 EMERGENCY DEPT VISIT HI MDM: CPT

## 2024-06-17 NOTE — ED PROVIDER NOTE - CLINICAL SUMMARY MEDICAL DECISION MAKING FREE TEXT BOX
patient sent in for intoxication and aggression at home.  Is currently calm and cooperative, clinically sober, AAOx3.  Ambulating well.  No current complaints and would like to go home.  Has capacity to make decisions.  Is ready for discharge.  Return precautions given.

## 2024-06-17 NOTE — ED ADULT NURSE NOTE - NSFALLUNIVINTERV_ED_ALL_ED
Bed/Stretcher in lowest position, wheels locked, appropriate side rails in place/Call bell, personal items and telephone in reach/Instruct patient to call for assistance before getting out of bed/chair/stretcher/Non-slip footwear applied when patient is off stretcher/[place] to call system/Physically safe environment - no spills, clutter or unnecessary equipment/Purposeful proactive rounding/Room/bathroom lighting operational, light cord in reach

## 2024-06-17 NOTE — ED ADULT TRIAGE NOTE - CHIEF COMPLAINT QUOTE
as per EMS to was drinking today got in an argument with family and was getting aggressive  with family and EMS

## 2024-06-17 NOTE — ED PROVIDER NOTE - PATIENT PORTAL LINK FT
You can access the FollowMyHealth Patient Portal offered by [facility] by registering at the following website: [email or web address]. By joining [employer]’s FollowMyHealth portal, you will also be able to view your health information using other applications (apps) compatible with our system.

## 2024-06-17 NOTE — ED PROVIDER NOTE - OBJECTIVE STATEMENT
39-year-old female history of crest syndrome, SLE, fibromyalgia sent in by family for alcohol intoxication and aggression.  Patient reports that her parents are quite strict and were accusing her so she got upset, does admit to drinking earlier in the day.  Is currently ambulatory, AAOx3 and would like to go home.  Does not want to stay for evaluation.  Is calm and cooperative. Denies drug use. States she is thinking of going to detox but would like to do it outpatient. Denies SI/HI.

## 2024-06-17 NOTE — ED ADULT NURSE NOTE - OBJECTIVE STATEMENT
presents to ED c/o aggression s/p etoh consumption. upon RN assessment, pt in yellow gown-pt calm, cooperative, speaking in full and coherent sentences, and ambulates with steady gait. pt requesting to be dc. pt dc by MD. chapman returned to pt prior to dc.

## 2024-09-18 ENCOUNTER — TRANSCRIPTION ENCOUNTER (OUTPATIENT)
Age: 39
End: 2024-09-18

## 2024-12-25 PROBLEM — F10.90 ALCOHOL USE: Status: ACTIVE | Noted: 2017-02-13

## 2025-03-24 NOTE — ED BEHAVIORAL HEALTH PROGRESS NOTE - REASON FOR INPATIENT LEVEL CARE
YESIKA MCFARLANE   (Key: [id number])  PA Case ID #: [id number]  Rx #: [id number]  Need Help?   Call us at [phone]    Outcome  Approved today by [employer] 2017  PA Case: [id number],   Status: Approved, Coverage Starts on: 12/23/2024 12:00:00 AM,   Coverage Ends on: 3/24/2026 12:00:00 AM.  Effective Date: 12/22/2024  Authorization Expiration Date: 3/23/2026  Drug  Praluent 75MG/ML auto-injectors    Form  [employer] Electronic PA Form (2017 NCPDP)    
Other

## 2025-08-14 ENCOUNTER — APPOINTMENT (OUTPATIENT)
Dept: GASTROENTEROLOGY | Facility: CLINIC | Age: 40
End: 2025-08-14

## 2025-08-23 ENCOUNTER — NON-APPOINTMENT (OUTPATIENT)
Age: 40
End: 2025-08-23

## 2025-08-25 ENCOUNTER — NON-APPOINTMENT (OUTPATIENT)
Age: 40
End: 2025-08-25

## 2025-08-25 ENCOUNTER — APPOINTMENT (OUTPATIENT)
Dept: GASTROENTEROLOGY | Facility: CLINIC | Age: 40
End: 2025-08-25
Payer: MEDICAID

## 2025-08-25 VITALS
OXYGEN SATURATION: 97 % | RESPIRATION RATE: 14 BRPM | HEIGHT: 60 IN | WEIGHT: 186 LBS | TEMPERATURE: 97 F | DIASTOLIC BLOOD PRESSURE: 93 MMHG | SYSTOLIC BLOOD PRESSURE: 112 MMHG | BODY MASS INDEX: 36.52 KG/M2 | HEART RATE: 100 BPM

## 2025-08-25 DIAGNOSIS — K76.0 FATTY (CHANGE OF) LIVER, NOT ELSEWHERE CLASSIFIED: ICD-10-CM

## 2025-08-25 DIAGNOSIS — R22.1 LOCALIZED SWELLING, MASS AND LUMP, NECK: ICD-10-CM

## 2025-08-25 DIAGNOSIS — R74.8 ABNORMAL LEVELS OF OTHER SERUM ENZYMES: ICD-10-CM

## 2025-08-25 DIAGNOSIS — K70.0 ALCOHOLIC FATTY LIVER: ICD-10-CM

## 2025-08-25 PROCEDURE — 99205 OFFICE O/P NEW HI 60 MIN: CPT

## 2025-08-25 RX ORDER — POTASSIUM CHLORIDE 10 MEQ
CAPSULE, EXTENDED RELEASE ORAL
Refills: 0 | Status: ACTIVE | COMMUNITY

## 2025-08-25 RX ORDER — DIAZEPAM 2 MG/1
2 TABLET ORAL
Refills: 0 | Status: ACTIVE | COMMUNITY

## 2025-08-25 RX ORDER — FUROSEMIDE 20 MG/1
20 TABLET ORAL
Refills: 0 | Status: ACTIVE | COMMUNITY

## 2025-08-25 RX ORDER — FLUOXETINE HYDROCHLORIDE 20 MG/1
20 CAPSULE ORAL
Refills: 0 | Status: ACTIVE | COMMUNITY

## 2025-08-25 RX ORDER — MEDROXYPROGESTERONE ACETATE 150 MG/ML
150 INJECTION, SUSPENSION INTRAMUSCULAR
Refills: 0 | Status: ACTIVE | COMMUNITY

## 2025-08-25 RX ORDER — OMEPRAZOLE 20 MG/1
20 CAPSULE, DELAYED RELEASE ORAL
Refills: 0 | Status: ACTIVE | COMMUNITY

## 2025-08-25 RX ORDER — QUETIAPINE FUMARATE 400 MG/1
400 TABLET ORAL
Refills: 0 | Status: ACTIVE | COMMUNITY

## 2025-08-25 RX ORDER — ARIPIPRAZOLE 5 MG/1
5 TABLET ORAL
Refills: 0 | Status: ACTIVE | COMMUNITY

## 2025-08-25 RX ORDER — GABAPENTIN 100 MG/1
100 CAPSULE ORAL
Refills: 0 | Status: ACTIVE | COMMUNITY